# Patient Record
Sex: FEMALE | Race: WHITE | URBAN - METROPOLITAN AREA
[De-identification: names, ages, dates, MRNs, and addresses within clinical notes are randomized per-mention and may not be internally consistent; named-entity substitution may affect disease eponyms.]

---

## 2017-02-02 PROBLEM — Z98.51 TUBAL LIGATION STATUS: Status: ACTIVE | Noted: 2017-02-02

## 2017-02-02 PROBLEM — E78.1 PURE HYPERTRIGLYCERIDEMIA: Status: ACTIVE | Noted: 2017-02-02

## 2017-02-03 ENCOUNTER — OFFICE VISIT (OUTPATIENT)
Dept: FAMILY MEDICINE CLINIC | Facility: CLINIC | Age: 52
End: 2017-02-03

## 2017-02-03 VITALS
HEART RATE: 56 BPM | BODY MASS INDEX: 25.97 KG/M2 | DIASTOLIC BLOOD PRESSURE: 90 MMHG | RESPIRATION RATE: 20 BRPM | HEIGHT: 60.75 IN | TEMPERATURE: 98 F | SYSTOLIC BLOOD PRESSURE: 142 MMHG | WEIGHT: 135.81 LBS

## 2017-02-03 DIAGNOSIS — L50.9 HIVES: Primary | ICD-10-CM

## 2017-02-03 DIAGNOSIS — C50.912 MALIGNANT NEOPLASM OF LEFT FEMALE BREAST, UNSPECIFIED SITE OF BREAST: ICD-10-CM

## 2017-02-03 PROBLEM — E78.5 HYPERLIPIDEMIA: Status: ACTIVE | Noted: 2017-02-03

## 2017-02-03 PROBLEM — M19.90 OSTEOARTHRITIS: Status: ACTIVE | Noted: 2017-02-03

## 2017-02-03 PROBLEM — J45.909 ASTHMA: Status: ACTIVE | Noted: 2017-02-03

## 2017-02-03 PROCEDURE — 99213 OFFICE O/P EST LOW 20 MIN: CPT | Performed by: FAMILY MEDICINE

## 2017-02-03 RX ORDER — DICYCLOMINE HCL 20 MG
20 TABLET ORAL
COMMUNITY
End: 2017-08-29

## 2017-02-03 RX ORDER — OMEGA-3-ACID ETHYL ESTERS 1 G/1
2 CAPSULE, LIQUID FILLED ORAL 2 TIMES DAILY
COMMUNITY
End: 2017-07-19

## 2017-02-03 RX ORDER — NIACIN 500 MG
500 TABLET ORAL DAILY
COMMUNITY
End: 2017-07-19

## 2017-02-03 RX ORDER — GEMFIBROZIL 600 MG/1
600 TABLET, FILM COATED ORAL 2 TIMES DAILY
COMMUNITY
End: 2017-02-03 | Stop reason: CLARIF

## 2017-02-03 RX ORDER — CETIRIZINE HYDROCHLORIDE 10 MG/1
10 TABLET ORAL 2 TIMES DAILY
COMMUNITY
End: 2017-07-19 | Stop reason: ALTCHOICE

## 2017-02-03 RX ORDER — FLUTICASONE PROPIONATE AND SALMETEROL 100; 50 UG/1; UG/1
POWDER RESPIRATORY (INHALATION) DAILY
COMMUNITY
End: 2017-07-19

## 2017-02-03 RX ORDER — ALBUTEROL SULFATE 90 UG/1
AEROSOL, METERED RESPIRATORY (INHALATION)
COMMUNITY
End: 2017-02-03 | Stop reason: CLARIF

## 2017-02-03 NOTE — PROGRESS NOTES
[facility] [place]  PROGRESS NOTE  Chief Complaint:   Patient presents with:  Rash: generalized- has seen dermatology      HPI:   This is a 46year old female coming in for [address] [place] [address] 9, 31, 30 2015 fluticasone-salmeterol (ADVAIR DISKUS) 100-50 MCG/DOSE Inhalation Aerosol Powder, Breath Activated Inhale  mg into the lungs daily. Disp:  Rfl:    niacin 500 MG Oral Tab Take 500 mg by mouth daily.  Disp:  Rfl:    Omega-3-acid Ethyl Esters (LOVAZA) anxiety. ENDOCRINOLOGIC:  Denies excessive sweating, cold or heat intolerance, polyuria or polydipsia.   ALLERGIES: SEE HPI    EXAM:   /90 mmHg  Pulse 56  Temp(Src) 97.7 °F (36.5 °C) (Tympanic)  Resp 20  Ht 60.75\"  Wt 135 lb 13 oz  BMI 25.87 kg/m2 E of breast and absent nipple     Osteoarthrosis     Tubal ligation status     Hives     Asthma     Hyperlipidemia     Osteoarthritis      Annemarie Loomis MD  2/3/2017  5:07 PM

## 2017-02-28 ENCOUNTER — TELEPHONE (OUTPATIENT)
Dept: FAMILY MEDICINE CLINIC | Facility: CLINIC | Age: 52
End: 2017-02-28

## 2017-02-28 DIAGNOSIS — R73.9 HYPERGLYCEMIA: Primary | ICD-10-CM

## 2017-02-28 DIAGNOSIS — E78.1 HYPERTRIGLYCERIDEMIA: ICD-10-CM

## 2017-02-28 RX ORDER — AMITRIPTYLINE HYDROCHLORIDE 10 MG/1
10 TABLET, FILM COATED ORAL NIGHTLY
Refills: 3 | COMMUNITY
Start: 2017-02-06 | End: 2020-11-25 | Stop reason: ALTCHOICE

## 2017-02-28 NOTE — TELEPHONE ENCOUNTER
Antonieta. I have no lab orders at this time. Pt can have fasting labs done in [facility].

## 2017-02-28 NOTE — TELEPHONE ENCOUNTER
Pt is getting lab drawn w/ Dr. Mine Pardo on 3/6. Pt last drawn on 11/1/16. Pt had elevated glucose at that time-113. Pt wanted to know if CR is needing any labs at this time-otherwise pt will have labs drawn again per Dr. Mine Pardo in July.   TAF#[phone]

## 2017-03-16 ENCOUNTER — TELEPHONE (OUTPATIENT)
Dept: FAMILY MEDICINE CLINIC | Facility: CLINIC | Age: 52
End: 2017-03-16

## 2017-03-17 ENCOUNTER — OFFICE VISIT (OUTPATIENT)
Dept: FAMILY MEDICINE CLINIC | Facility: CLINIC | Age: 52
End: 2017-03-17

## 2017-03-17 VITALS
BODY MASS INDEX: 26.01 KG/M2 | HEART RATE: 60 BPM | SYSTOLIC BLOOD PRESSURE: 126 MMHG | HEIGHT: 60.5 IN | WEIGHT: 136 LBS | TEMPERATURE: 98 F | DIASTOLIC BLOOD PRESSURE: 90 MMHG | RESPIRATION RATE: 20 BRPM

## 2017-03-17 DIAGNOSIS — Z01.818 PREOP EXAMINATION: Primary | ICD-10-CM

## 2017-03-17 DIAGNOSIS — C50.912 MALIGNANT NEOPLASM OF LEFT FEMALE BREAST, UNSPECIFIED SITE OF BREAST: ICD-10-CM

## 2017-03-17 DIAGNOSIS — Z90.13 ACQUIRED ABSENCE OF BREAST AND ABSENT NIPPLE, BILATERAL: ICD-10-CM

## 2017-03-17 DIAGNOSIS — N65.0 DEFORMITY OF RECONSTRUCTED BREAST: ICD-10-CM

## 2017-03-17 PROCEDURE — 99215 OFFICE O/P EST HI 40 MIN: CPT | Performed by: FAMILY MEDICINE

## 2017-03-17 PROCEDURE — 93000 ELECTROCARDIOGRAM COMPLETE: CPT | Performed by: FAMILY MEDICINE

## 2017-03-17 RX ORDER — CEPHALEXIN 500 MG/1
CAPSULE ORAL
Refills: 0 | COMMUNITY
Start: 2017-03-15 | End: 2017-05-09

## 2017-03-17 RX ORDER — PREDNISONE 10 MG/1
10 TABLET ORAL
Refills: 0 | COMMUNITY
Start: 2017-03-03 | End: 2017-10-13 | Stop reason: ALTCHOICE

## 2017-03-17 RX ORDER — HYDROCODONE BITARTRATE AND ACETAMINOPHEN 7.5; 325 MG/1; MG/1
TABLET ORAL
Refills: 0 | COMMUNITY
Start: 2017-03-16 | End: 2017-05-09

## 2017-03-17 NOTE — PATIENT INSTRUCTIONS
Pt doing well.  Plan for 6 month f.u - pap after July 1  Recheck post op prn    Pt medically cleared for breast reconstructive surgery

## 2017-03-17 NOTE — PROGRESS NOTES
CC: Annual Physical Exam    HPI:   Tim Xavier is a 46year old female who presents for a complete physical exam.  Patient complains of need for preop clearance.      Pt with fall about 6 month ago- leaking of silocone implant--right- to have replaced 300 mg by mouth 3 (three) times daily. Disp:  Rfl:    gemfibrozil (LOPID) 600 MG Oral Tab Take 600 mg by mouth 2 (two) times daily. Disp:  Rfl:    Multiple Vitamins-Minerals (CENTRUM SILVER) Oral Tab Take 1 tablet by mouth daily.    Disp:  Rfl:    hydro pain, visual loss, blurred vision, double vision or yellow sclerae. Ears, Nose, Throat:  Denies hearing loss, sneezing, congestion, runny nose or sore throat. INTEGUMENTARY:  Denies rashes, itching, skin lesion, or excessive skin dryness.   CARDIOVASCULAR: no thyromegaly. SKIN: No rashes, no skin lesion, no bruising, good turgor. HEART:  Regular rate and rhythm, no murmurs, rubs or gallops. LUNGS: Clear to auscultation bilterally, no rales/rhonchi/wheezing.   BREAST: Scars of both breasts with palpable imp patient indicates understanding of these issues and agrees to the plan.         Meds & Refills for this Visit:    No prescriptions requested or ordered in this encounter       Imaging & Consults:  ELECTROCARDIOGRAM, COMPLETE    The patient is asked to retur

## 2017-03-17 NOTE — TELEPHONE ENCOUNTER
Future Appointments  Date Time Provider Vicente Estrada   3/17/2017 9:30 AM Brisa Flower MD [facility] [facility]       See note above from yesterday, pt with appt this AM.

## 2017-03-20 ENCOUNTER — TELEPHONE (OUTPATIENT)
Dept: FAMILY MEDICINE CLINIC | Facility: CLINIC | Age: 52
End: 2017-03-20

## 2017-05-09 ENCOUNTER — OFFICE VISIT (OUTPATIENT)
Dept: FAMILY MEDICINE CLINIC | Facility: CLINIC | Age: 52
End: 2017-05-09

## 2017-05-09 VITALS
WEIGHT: 138.19 LBS | HEART RATE: 64 BPM | TEMPERATURE: 98 F | SYSTOLIC BLOOD PRESSURE: 126 MMHG | BODY MASS INDEX: 26.43 KG/M2 | HEIGHT: 60.5 IN | DIASTOLIC BLOOD PRESSURE: 90 MMHG | RESPIRATION RATE: 16 BRPM

## 2017-05-09 DIAGNOSIS — N30.01 ACUTE CYSTITIS WITH HEMATURIA: Primary | ICD-10-CM

## 2017-05-09 PROCEDURE — 81003 URINALYSIS AUTO W/O SCOPE: CPT | Performed by: NURSE PRACTITIONER

## 2017-05-09 PROCEDURE — 87086 URINE CULTURE/COLONY COUNT: CPT | Performed by: NURSE PRACTITIONER

## 2017-05-09 PROCEDURE — 99213 OFFICE O/P EST LOW 20 MIN: CPT | Performed by: NURSE PRACTITIONER

## 2017-05-09 RX ORDER — NITROFURANTOIN 25; 75 MG/1; MG/1
100 CAPSULE ORAL 2 TIMES DAILY
Qty: 14 CAPSULE | Refills: 0 | Status: SHIPPED | OUTPATIENT
Start: 2017-05-09 | End: 2017-05-16

## 2017-05-09 RX ORDER — PHENAZOPYRIDINE HYDROCHLORIDE 200 MG/1
200 TABLET, FILM COATED ORAL 3 TIMES DAILY PRN
Qty: 9 TABLET | Refills: 0 | Status: SHIPPED | OUTPATIENT
Start: 2017-05-09 | End: 2017-07-19 | Stop reason: ALTCHOICE

## 2017-05-09 NOTE — PROGRESS NOTES
HPI:    Patient ID: Lary Christopher is a 46year old female. Urinary Frequency   This is a new problem. The current episode started yesterday. The problem occurs every urination. The problem has been gradually worsening.  Associated symptoms include ch MCG/DOSE Inhalation Aerosol Powder, Breath Activated Inhale  mg into the lungs daily. Disp:  Rfl:    niacin 500 MG Oral Tab Take 500 mg by mouth daily.  Disp:  Rfl:    Omega-3-acid Ethyl Esters (LOVAZA) 1 g Oral Cap Take 2 g by mouth 2 (two) times carrie Phenazopyridine HCl (PYRIDIUM) 200 MG Oral Tab 9 tablet 0      Sig: Take 1 tablet (200 mg total) by mouth 3 (three) times daily as needed for Pain (for bladder spasms).            Imaging & Referrals:  None        Patient Instructions   Urine culture pendin

## 2017-05-09 NOTE — PATIENT INSTRUCTIONS
Urine culture pending. Take Pyridium 3 times daily as needed. Informed patient we will change her urine to upright orange color. Continue to push fluids.   Encouraged to eat yogurt or take probiotic daily while on antibiotic for prevention of a yeast inf

## 2017-05-11 ENCOUNTER — TELEPHONE (OUTPATIENT)
Dept: FAMILY MEDICINE CLINIC | Facility: CLINIC | Age: 52
End: 2017-05-11

## 2017-05-11 NOTE — TELEPHONE ENCOUNTER
----- Message from DAREN Valentino sent at 5/11/2017  9:44 AM CDT -----  Urine culture is negative. Please let patient know. Thank you.  Neema Menendez, 05/11/2017, 9:44 AM

## 2017-07-05 ENCOUNTER — TELEPHONE (OUTPATIENT)
Dept: FAMILY MEDICINE CLINIC | Facility: CLINIC | Age: 52
End: 2017-07-05

## 2017-07-05 DIAGNOSIS — E78.5 HYPERLIPIDEMIA, UNSPECIFIED HYPERLIPIDEMIA TYPE: Primary | ICD-10-CM

## 2017-07-05 RX ORDER — GEMFIBROZIL 600 MG/1
600 TABLET, FILM COATED ORAL 2 TIMES DAILY
Refills: 0 | Status: CANCELLED | OUTPATIENT
Start: 2017-07-05

## 2017-07-05 RX ORDER — OMEGA-3-ACID ETHYL ESTERS 1 G/1
2 CAPSULE, LIQUID FILLED ORAL 2 TIMES DAILY
Refills: 0 | Status: CANCELLED | OUTPATIENT
Start: 2017-07-05

## 2017-07-05 NOTE — TELEPHONE ENCOUNTER
[id number] Jillian Rueda for order for lipid and chem panel    Refill of lopid pendng lab review    Lovaza Rx is not from us.

## 2017-07-05 NOTE — TELEPHONE ENCOUNTER
Future Appointments  Date Time Provider Vicente Estrada   7/19/2017 11:00 AM Cj Camacho MD [facility] [place] [facility] [place]       1. Request per pt - add lipids to labs ordered per hematology?  Pt has lab appt set for 7/17 for Dr. Cristina Castaneda, last lipid p

## 2017-07-05 NOTE — TELEPHONE ENCOUNTER
Pt informed, states she is good on medications at this time. Pt states Lovaza is refilled per CR- last sent to mail order on 8/22/16.   Lab orders faxed to Dr. Ronen Katz

## 2017-07-17 LAB
AMB EXT CHOL/HDL RATIO: 4.8
AMB EXT CHOLESTEROL, TOTAL: 173 MG/DL
AMB EXT CREATININE: 0.87 MG/DL
AMB EXT GLUCOSE: 97 MG/DL
AMB EXT HDL CHOLESTEROL: 36 MG/DL
AMB EXT LDL CHOLESTEROL, DIRECT: 92 MG/DL
AMB EXT NON HDL CHOL: 137 MG/DL
AMB EXT TRIGLYCERIDES: 225 MG/DL

## 2017-07-19 ENCOUNTER — OFFICE VISIT (OUTPATIENT)
Dept: FAMILY MEDICINE CLINIC | Facility: CLINIC | Age: 52
End: 2017-07-19

## 2017-07-19 ENCOUNTER — TELEPHONE (OUTPATIENT)
Dept: FAMILY MEDICINE CLINIC | Facility: CLINIC | Age: 52
End: 2017-07-19

## 2017-07-19 VITALS
HEIGHT: 60.5 IN | SYSTOLIC BLOOD PRESSURE: 122 MMHG | WEIGHT: 136.81 LBS | DIASTOLIC BLOOD PRESSURE: 80 MMHG | HEART RATE: 64 BPM | TEMPERATURE: 98 F | BODY MASS INDEX: 26.17 KG/M2 | RESPIRATION RATE: 20 BRPM

## 2017-07-19 DIAGNOSIS — E78.1 PURE HYPERTRIGLYCERIDEMIA: ICD-10-CM

## 2017-07-19 DIAGNOSIS — C50.912 MALIGNANT NEOPLASM OF LEFT FEMALE BREAST, UNSPECIFIED ESTROGEN RECEPTOR STATUS, UNSPECIFIED SITE OF BREAST (HCC): ICD-10-CM

## 2017-07-19 DIAGNOSIS — E78.5 HYPERLIPIDEMIA, UNSPECIFIED HYPERLIPIDEMIA TYPE: ICD-10-CM

## 2017-07-19 DIAGNOSIS — Z00.00 VISIT FOR ANNUAL HEALTH EXAMINATION: Primary | ICD-10-CM

## 2017-07-19 DIAGNOSIS — M15.9 PRIMARY OSTEOARTHRITIS INVOLVING MULTIPLE JOINTS: ICD-10-CM

## 2017-07-19 LAB
ALT: 27
AST: 27
BILIRUBIN, TOTAL: 0.5 MG/DL
BUN: 26
CA 15-3: 11
CA 27.29: 15 U/ML
CALCIUM: 9.9
CHLORIDE: 104
HEMATOCRIT: 40.5 %
HEMOGLOBIN: 14.1 G/DL (ref 12–15)
MPV: 10.6
PLATELETS: 210 X10E3/UL
POTASSIUM: 4.4
RBC: 4.62 /HPF
SODIUM: 140
WBC: 3.3 /HPF

## 2017-07-19 PROCEDURE — 99396 PREV VISIT EST AGE 40-64: CPT | Performed by: FAMILY MEDICINE

## 2017-07-19 RX ORDER — DICYCLOMINE HCL 20 MG
TABLET ORAL
COMMUNITY
End: 2017-07-19

## 2017-07-19 RX ORDER — GEMFIBROZIL 600 MG/1
600 TABLET, FILM COATED ORAL 2 TIMES DAILY
COMMUNITY
End: 2017-07-19

## 2017-07-19 RX ORDER — GEMFIBROZIL 600 MG/1
600 TABLET, FILM COATED ORAL 2 TIMES DAILY
Qty: 180 TABLET | Refills: 3 | Status: SHIPPED | OUTPATIENT
Start: 2017-07-19 | End: 2018-06-03

## 2017-07-19 RX ORDER — NIACIN 500 MG
500 TABLET ORAL DAILY
COMMUNITY
End: 2021-12-02

## 2017-07-19 RX ORDER — CETIRIZINE HYDROCHLORIDE 10 MG/1
10 TABLET ORAL DAILY
COMMUNITY

## 2017-07-19 RX ORDER — FLUTICASONE PROPIONATE AND SALMETEROL 100; 50 UG/1; UG/1
1 POWDER RESPIRATORY (INHALATION) DAILY
COMMUNITY
End: 2020-10-12

## 2017-07-19 RX ORDER — OMEGA-3-ACID ETHYL ESTERS 1 G/1
2 CAPSULE, LIQUID FILLED ORAL 2 TIMES DAILY
Qty: 180 CAPSULE | Refills: 3 | Status: SHIPPED | OUTPATIENT
Start: 2017-07-19 | End: 2017-10-22

## 2017-07-19 RX ORDER — OMEGA-3-ACID ETHYL ESTERS 1 G/1
2 CAPSULE, LIQUID FILLED ORAL 2 TIMES DAILY
COMMUNITY
End: 2017-07-19

## 2017-07-19 RX ORDER — ALBUTEROL SULFATE 90 UG/1
2 AEROSOL, METERED RESPIRATORY (INHALATION) AS NEEDED
COMMUNITY
End: 2020-10-12

## 2017-07-19 NOTE — PROGRESS NOTES
CC: Annual Physical Exam    HPI:   Roscoe Boone is a 46year old female who presents for a complete physical exam.  Patient complains of generally doing well    Recent cut on left hand-miror,skin healed -still sore.      Pt had breast implant replaced g Oral Cap Take 2 capsules (2 g total) by mouth 2 (two) times daily. Disp: 180 capsule Rfl: 3   Amitriptyline HCl 10 MG Oral Tab Take 10 mg by mouth nightly. Disp:  Rfl: 3   Dicyclomine HCl (BENTYL) 20 MG Oral Tab Take 20 mg by mouth.  PRN Disp:  Rfl: Concern  Caffeine Concern        No  Exercise                Yes  Seat Belt               Yes  Special Diet            Yes    Comment:low fat, low sugar  Stress Concern          No  Weight Concern          No        Exercise: several times a week.   Diet: m alert, awake and oriented, well developed, well nourished, no apparent distress.   HEENT:  Head:  Normocephalic, atraumatic Eyes: EOMI, PERRLA, no scleral icterus, conjunctivae clear bilaterally, no eye discharge Ears: TM's clear and intact bilaterally, no encounter diagnosis)  Primary osteoarthritis involving multiple joints  Malignant neoplasm of left female breast, unspecified estrogen receptor status, unspecified site of breast    Patient Instructions   Hydrate well with water    mvi and bcomplex daily

## 2017-07-20 PROBLEM — Z98.51 TUBAL LIGATION STATUS: Status: RESOLVED | Noted: 2017-02-02 | Resolved: 2017-07-20

## 2017-07-31 ENCOUNTER — OFFICE VISIT (OUTPATIENT)
Dept: FAMILY MEDICINE CLINIC | Facility: CLINIC | Age: 52
End: 2017-07-31

## 2017-07-31 VITALS
RESPIRATION RATE: 16 BRPM | OXYGEN SATURATION: 96 % | HEART RATE: 93 BPM | HEIGHT: 60.5 IN | TEMPERATURE: 99 F | SYSTOLIC BLOOD PRESSURE: 112 MMHG | DIASTOLIC BLOOD PRESSURE: 74 MMHG | WEIGHT: 137.38 LBS | BODY MASS INDEX: 26.27 KG/M2

## 2017-07-31 DIAGNOSIS — J01.90 ACUTE SINUSITIS, RECURRENCE NOT SPECIFIED, UNSPECIFIED LOCATION: Primary | ICD-10-CM

## 2017-07-31 DIAGNOSIS — R05.9 COUGH: ICD-10-CM

## 2017-07-31 PROCEDURE — 99213 OFFICE O/P EST LOW 20 MIN: CPT | Performed by: NURSE PRACTITIONER

## 2017-07-31 RX ORDER — AMOXICILLIN AND CLAVULANATE POTASSIUM 875; 125 MG/1; MG/1
1 TABLET, FILM COATED ORAL 2 TIMES DAILY
Qty: 20 TABLET | Refills: 0 | Status: SHIPPED | OUTPATIENT
Start: 2017-07-31 | End: 2017-08-10

## 2017-07-31 NOTE — PATIENT INSTRUCTIONS
Directed to take antibiotics until gone. Recommend to eat yogurt or take probiotic daily while on antibiotic. Treat symptoms as needed. Take Coricidin HPB for the congestion  Return to clinic if not better in 48-72 hours. Otherwise follow-up as needed.

## 2017-07-31 NOTE — PROGRESS NOTES
HPI:    Patient ID: Johnnie Oconnor is a 46year old female. HPI     Present with complaints of cough with green/brown sputum. Also is having pain to the lymph nodes as well as the ears. Started last week on Wednesday. Thursday started sudafed.    No f nightly. Disp:  Rfl: 3   Dicyclomine HCl (BENTYL) 20 MG Oral Tab Take 20 mg by mouth. PRN Disp:  Rfl:    anastrozole 1 MG Oral Tab tab Take 1 mg by mouth daily.    Disp:  Rfl:    Calcium Carb-Cholecalciferol (CALCIUM 1000 + D) 1000-800 MG-UNIT Oral Tab da encounter diagnosis)  Cough    No orders of the defined types were placed in this encounter.       Meds This Visit:  Signed Prescriptions Disp Refills    Amoxicillin-Pot Clavulanate 875-125 MG Oral Tab 20 tablet 0      Sig: Take 1 tablet by mouth 2 (two) ti

## 2017-08-04 ENCOUNTER — TELEPHONE (OUTPATIENT)
Dept: FAMILY MEDICINE CLINIC | Facility: CLINIC | Age: 52
End: 2017-08-04

## 2017-08-04 NOTE — TELEPHONE ENCOUNTER
Pt calling-states that R ear is plugged. Pt states that sinus s/s are improved  Pt was seen on 7/31, currently on antx- pt mentioned that she also started on nasal spray today.   Discussed with Cynthia Flores NP,  Pt advised to continue with nasal spray,

## 2017-08-04 NOTE — TELEPHONE ENCOUNTER
ears still plugged      on antibiotic and wants to get hearing back     Please advise RX to [facility] in [facility]

## 2017-08-07 ENCOUNTER — OFFICE VISIT (OUTPATIENT)
Dept: FAMILY MEDICINE CLINIC | Facility: CLINIC | Age: 52
End: 2017-08-07

## 2017-08-07 ENCOUNTER — TELEPHONE (OUTPATIENT)
Dept: FAMILY MEDICINE CLINIC | Facility: CLINIC | Age: 52
End: 2017-08-07

## 2017-08-07 VITALS
RESPIRATION RATE: 16 BRPM | TEMPERATURE: 98 F | DIASTOLIC BLOOD PRESSURE: 76 MMHG | HEART RATE: 78 BPM | HEIGHT: 61 IN | WEIGHT: 141.13 LBS | SYSTOLIC BLOOD PRESSURE: 118 MMHG | BODY MASS INDEX: 26.65 KG/M2

## 2017-08-07 DIAGNOSIS — J01.90 ACUTE SINUSITIS, RECURRENCE NOT SPECIFIED, UNSPECIFIED LOCATION: ICD-10-CM

## 2017-08-07 DIAGNOSIS — H65.01 RIGHT ACUTE SEROUS OTITIS MEDIA, RECURRENCE NOT SPECIFIED: Primary | ICD-10-CM

## 2017-08-07 PROCEDURE — 99213 OFFICE O/P EST LOW 20 MIN: CPT | Performed by: NURSE PRACTITIONER

## 2017-08-07 RX ORDER — FLUTICASONE PROPIONATE 50 MCG
2 SPRAY, SUSPENSION (ML) NASAL DAILY
Qty: 3 BOTTLE | Refills: 0 | Status: SHIPPED | OUTPATIENT
Start: 2017-08-07

## 2017-08-07 RX ORDER — FLUTICASONE PROPIONATE 50 MCG
2 SPRAY, SUSPENSION (ML) NASAL DAILY
Qty: 1 BOTTLE | Refills: 0 | Status: SHIPPED | OUTPATIENT
Start: 2017-08-07 | End: 2017-08-07

## 2017-08-07 RX ORDER — AZITHROMYCIN 500 MG/1
500 TABLET, FILM COATED ORAL DAILY
Qty: 7 TABLET | Refills: 0 | Status: SHIPPED | OUTPATIENT
Start: 2017-08-07 | End: 2017-08-14

## 2017-08-07 NOTE — PROGRESS NOTES
HPI:    Patient ID: Leena Pfeiffer is a 46year old female. HPI    Present with complaints of right ear ringing with a whistling sound. Also started on medication for pink eye. Is still on pink eye. All the other symptoms are better.  Left ear gets pl nightly. Disp:  Rfl: 3   Dicyclomine HCl (BENTYL) 20 MG Oral Tab Take 20 mg by mouth. PRN Disp:  Rfl:    anastrozole 1 MG Oral Tab tab Take 1 mg by mouth daily.    Disp:  Rfl:    Calcium Carb-Cholecalciferol (CALCIUM 1000 + D) 1000-800 MG-UNIT Oral Tab da or ordered in this encounter    Imaging & Referrals:  None      There are no Patient Instructions on file for this visit.     CN#[id number]

## 2017-08-07 NOTE — TELEPHONE ENCOUNTER
Pt states her ear is still plugged and ringing, states she can hear her ear beat. Pt states she tried to call ENT- they do not any appts until the end of the month. Pt has been using otc nasal spray-no chnage.   Follow up appt scheduled with Neema Tavares

## 2017-08-07 NOTE — PATIENT INSTRUCTIONS
Start taking Zithromax 500 mg daily in addition to the Augmentin. Refill Flonase nasal spray for patient and encouraged to start using regularly. Do not use nasal decongestants. Continue with the yogurt or probiotic while on the antibiotic.   If needs to

## 2017-08-08 ENCOUNTER — TELEPHONE (OUTPATIENT)
Dept: FAMILY MEDICINE CLINIC | Facility: CLINIC | Age: 52
End: 2017-08-08

## 2017-08-08 DIAGNOSIS — E78.1 HYPERTRIGLYCERIDEMIA: Primary | ICD-10-CM

## 2017-08-08 NOTE — TELEPHONE ENCOUNTER
Pt had A1C order from Feb. 6 [facility] lab and 's office, no record of lab report found. Pt latest glucose done on 7/17/17 was 97. Cancel A1C order, or postpone lab ? Please advise.

## 2017-08-09 NOTE — TELEPHONE ENCOUNTER
Pt informed- states she will be getting drawn again for Dr. Carlos Law in early November. Pt is asking to also have lipid order added to A1C order - pt states her Triglycerides were high at last draw on 7/17/17. Pt has hx: of hypertriglycerdemia.

## 2017-08-10 ENCOUNTER — TELEPHONE (OUTPATIENT)
Dept: FAMILY MEDICINE CLINIC | Facility: CLINIC | Age: 52
End: 2017-08-10

## 2017-08-10 NOTE — TELEPHONE ENCOUNTER
Pt states that the Z Pack prescribed on Monday has given her bad diarrhea since then. Pt states that she has taken Immodium OTC and it still isn't helping. Pt states antibiotic is not helping her ear pain.     Please advise on what your recommendations

## 2017-08-10 NOTE — TELEPHONE ENCOUNTER
Stop the antibiotic. If diarrhea persists, needs appointment for possible stool specimen. Recommend ibuprofen and antihistamines for the ear pain.    Neema Menendez, 08/10/17, 1:40 PM

## 2017-08-29 RX ORDER — DICYCLOMINE HCL 20 MG
20 TABLET ORAL
Refills: 0 | Status: CANCELLED | OUTPATIENT
Start: 2017-08-29

## 2017-08-29 RX ORDER — DICYCLOMINE HYDROCHLORIDE 10 MG/1
CAPSULE ORAL
Qty: 90 CAPSULE | Refills: 1 | Status: SHIPPED | OUTPATIENT
Start: 2017-08-29 | End: 2021-03-30

## 2017-08-29 NOTE — TELEPHONE ENCOUNTER
Pt last seen for annual on 7/19/17  Pt states she has hx: IBS, pt states her last RX of 30 pills lasted 2 years,   Rsoa Levo states her IBS episodes are very infrequent. Pt states she reviewed her medications with CR at time of her px.

## 2017-09-20 ENCOUNTER — TELEPHONE (OUTPATIENT)
Dept: FAMILY MEDICINE CLINIC | Facility: CLINIC | Age: 52
End: 2017-09-20

## 2017-09-20 DIAGNOSIS — E78.2 MIXED HYPERLIPIDEMIA: Primary | ICD-10-CM

## 2017-09-20 DIAGNOSIS — Z00.00 PERIODIC HEALTH ASSESSMENT, GENERAL SCREENING, ADULT: ICD-10-CM

## 2017-09-20 NOTE — TELEPHONE ENCOUNTER
Pt informed,  Pt states she is interested in having a titer drawn  Pt states she will be due for lipid panel again in NOvember. Pt would like lab orders faxed to Dr. Anisha Trinidad office please.

## 2017-09-20 NOTE — TELEPHONE ENCOUNTER
Pt inquired about HPV vaccine- pt informed that is recommended for ages as young as 5 thru age 32. Pt states she thought she had chicken pox seen on- not seen in [facility]- also looked for paper chart-   No longer available.  Pt was seen for wellness ex

## 2017-10-13 ENCOUNTER — OFFICE VISIT (OUTPATIENT)
Dept: FAMILY MEDICINE CLINIC | Facility: CLINIC | Age: 52
End: 2017-10-13

## 2017-10-13 VITALS
BODY MASS INDEX: 26.62 KG/M2 | TEMPERATURE: 98 F | SYSTOLIC BLOOD PRESSURE: 132 MMHG | WEIGHT: 141 LBS | HEART RATE: 80 BPM | HEIGHT: 61 IN | RESPIRATION RATE: 16 BRPM | DIASTOLIC BLOOD PRESSURE: 76 MMHG

## 2017-10-13 DIAGNOSIS — R07.81 RIB PAIN ON LEFT SIDE: Primary | ICD-10-CM

## 2017-10-13 PROCEDURE — 99214 OFFICE O/P EST MOD 30 MIN: CPT | Performed by: FAMILY MEDICINE

## 2017-10-13 RX ORDER — NAPROXEN 500 MG/1
500 TABLET ORAL 2 TIMES DAILY WITH MEALS
Qty: 40 TABLET | Refills: 2 | Status: SHIPPED | OUTPATIENT
Start: 2017-10-13 | End: 2017-12-04 | Stop reason: ALTCHOICE

## 2017-10-13 NOTE — PROGRESS NOTES
Chief Complaint:   Patient presents with:  Pain: Patient fell at the gym a week ago and is still having rib pain left side      HPI:   This is a 46year old female coming in for pain in the right chest wall area. Patient had a fall while exercising. Biotin 92803 MCG Oral Tablet Dispersible Take 1 tablet by mouth daily. Disp:  Rfl:    gemfibrozil (LOPID) 600 MG Oral Tab Take 1 tablet (600 mg total) by mouth 2 (two) times daily.  Disp: 180 tablet Rfl: 3   Omega-3-acid Ethyl Esters (LOVAZA) 1 g Oral Ca Wt 141 lb   BMI 26.64 kg/m²  Estimated body mass index is 26.64 kg/m² as calculated from the following:    Height as of this encounter: 61\". Weight as of this encounter: 141 lb. Vital signs reviewed. Appears stated age, well groomed.     Physical Exa

## 2017-10-23 RX ORDER — OMEGA-3-ACID ETHYL ESTERS 1 G/1
CAPSULE, LIQUID FILLED ORAL
Qty: 360 CAPSULE | Refills: 1 | Status: SHIPPED | OUTPATIENT
Start: 2017-10-23 | End: 2018-06-03

## 2017-10-23 NOTE — TELEPHONE ENCOUNTER
Future appt:     Your appointments     Date & Time Appointment Department [facility])    Oct 27, 2017  9:00 AM CDT Follow up with Kelley Ruiz MD [address] [facility]        [address], S

## 2017-12-04 ENCOUNTER — OFFICE VISIT (OUTPATIENT)
Dept: FAMILY MEDICINE CLINIC | Facility: CLINIC | Age: 52
End: 2017-12-04

## 2017-12-04 VITALS
TEMPERATURE: 99 F | OXYGEN SATURATION: 99 % | WEIGHT: 145 LBS | BODY MASS INDEX: 28.47 KG/M2 | DIASTOLIC BLOOD PRESSURE: 80 MMHG | SYSTOLIC BLOOD PRESSURE: 120 MMHG | HEIGHT: 60 IN | RESPIRATION RATE: 16 BRPM | HEART RATE: 80 BPM

## 2017-12-04 DIAGNOSIS — J02.9 SORE THROAT: ICD-10-CM

## 2017-12-04 DIAGNOSIS — J02.9 PHARYNGITIS, UNSPECIFIED ETIOLOGY: Primary | ICD-10-CM

## 2017-12-04 PROCEDURE — 87081 CULTURE SCREEN ONLY: CPT | Performed by: NURSE PRACTITIONER

## 2017-12-04 PROCEDURE — 87880 STREP A ASSAY W/OPTIC: CPT | Performed by: NURSE PRACTITIONER

## 2017-12-04 PROCEDURE — 99213 OFFICE O/P EST LOW 20 MIN: CPT | Performed by: NURSE PRACTITIONER

## 2017-12-04 RX ORDER — AMOXICILLIN 500 MG/1
500 CAPSULE ORAL 3 TIMES DAILY
Qty: 30 CAPSULE | Refills: 0 | Status: SHIPPED | OUTPATIENT
Start: 2017-12-04 | End: 2017-12-14

## 2017-12-04 NOTE — PATIENT INSTRUCTIONS
Push fluids, rest.  Rapid strep negative, strep throat culture pending, will take 2-3 days for results. Take antibiotics as prescribed, take with food. Tylenol or ibuprofen over the counter for discomfort. 1tsp honey three times a day x 3 days.   Warm sa · Stop smoking or reduce contact with secondhand smoke. Smoke irritates the tender throat lining. · Limit contact with pets and with allergy-causing substances, such as pollen and mold.   · When you’re around someone with a sore throat or cold, wash your h

## 2017-12-04 NOTE — PROGRESS NOTES
CHIEF COMPLAINT:   Patient presents with:  Sore Throat: headache, productive cough- brown [id number] has got worse      HPI:   Ivana Navarrete is a 46year old femalepresents to clinic with symptoms of sore throat, productive cough, and headache x one w niacin 500 MG Oral Tab Take 500 mg by mouth daily. Disp:  Rfl:    Biotin 24161 MCG Oral Tablet Dispersible Take 1 tablet by mouth daily. Disp:  Rfl:    gemfibrozil (LOPID) 600 MG Oral Tab Take 1 tablet (600 mg total) by mouth 2 (two) times daily.  Disp: 180 THROAT: oral mucosa pink, moist. Posterior pharynx erythematous and injected. no exudates. Tonsils 2/4. Breath non-malodorous. No uvular deviation. No drooling. NECK: supple  LUNGS: clear to auscultation bilaterally, no wheezes, crackles, or rhonchi.  Sera Sore throats happen for many reasons, such as colds, allergies, and infections caused by viruses or bacteria. In any case, your throat becomes red and sore.  Your goal for self-care is to reduce your discomfort while giving your throat a chance to heal.  Mo · A temperature over 101°F (38.3°C)  · White spots on the throat  · Great difficulty swallowing  · Trouble breathing  · A skin rash  · Recent exposure to someone else with strep bacteria  · Severe hoarseness and swollen glands in the neck or jaw   Date [place]

## 2017-12-06 ENCOUNTER — TELEPHONE (OUTPATIENT)
Dept: FAMILY MEDICINE CLINIC | Facility: CLINIC | Age: 52
End: 2017-12-06

## 2017-12-06 NOTE — TELEPHONE ENCOUNTER
----- Message from DAREN Christianson sent at 12/6/2017 12:32 PM CST -----  Negative strep culture- please notify patient

## 2017-12-20 ENCOUNTER — TELEPHONE (OUTPATIENT)
Dept: FAMILY MEDICINE CLINIC | Facility: CLINIC | Age: 52
End: 2017-12-20

## 2017-12-20 NOTE — TELEPHONE ENCOUNTER
A1C was ordered back 2/28/17-  However since then pt had glucose of 97 dated 7/17/17 and 94 dated 11/20/17-  Is A1C still recommended? Please advise.

## 2018-06-04 RX ORDER — GEMFIBROZIL 600 MG/1
TABLET, FILM COATED ORAL
Qty: 180 TABLET | Refills: 0 | Status: SHIPPED | OUTPATIENT
Start: 2018-06-04 | End: 2018-06-12

## 2018-06-04 RX ORDER — OMEGA-3-ACID ETHYL ESTERS 1 G/1
CAPSULE, LIQUID FILLED ORAL
Qty: 360 CAPSULE | Refills: 0 | Status: SHIPPED | OUTPATIENT
Start: 2018-06-04 | End: 2018-09-03

## 2018-06-04 NOTE — TELEPHONE ENCOUNTER
RF request from [facility] for [place] 3 #360 and Gemfibrozil 60 MG #180. Please advise.        Future appt:    Last Appointment:  7/19/2017; No f/u recommended    CHOLESTEROL (no units)   Date Value   11/20/2017 188   ----------  HDL Cholesterol (mg/dL)   D

## 2018-06-12 ENCOUNTER — TELEPHONE (OUTPATIENT)
Dept: FAMILY MEDICINE CLINIC | Facility: CLINIC | Age: 53
End: 2018-06-12

## 2018-06-12 RX ORDER — GEMFIBROZIL 600 MG/1
TABLET, FILM COATED ORAL
Qty: 180 TABLET | Refills: 0 | Status: SHIPPED | OUTPATIENT
Start: 2018-06-12 | End: 2018-07-09

## 2018-06-12 NOTE — TELEPHONE ENCOUNTER
Future appt:    Last Appointment:  7/19/17 with Dr. Julito Howard for physical  This med was refilled to [facility] mailorder #180  Patient states she was notified that the [facility] does not have this med in [address] - it is on backorder  Patient

## 2018-06-14 ENCOUNTER — OFFICE VISIT (OUTPATIENT)
Dept: FAMILY MEDICINE CLINIC | Facility: CLINIC | Age: 53
End: 2018-06-14

## 2018-06-14 VITALS
HEART RATE: 66 BPM | TEMPERATURE: 97 F | BODY MASS INDEX: 28.11 KG/M2 | SYSTOLIC BLOOD PRESSURE: 104 MMHG | WEIGHT: 143.19 LBS | HEIGHT: 60 IN | DIASTOLIC BLOOD PRESSURE: 66 MMHG

## 2018-06-14 DIAGNOSIS — M62.838 MUSCLE SPASM: Primary | ICD-10-CM

## 2018-06-14 PROCEDURE — 99213 OFFICE O/P EST LOW 20 MIN: CPT | Performed by: NURSE PRACTITIONER

## 2018-06-14 RX ORDER — CYCLOBENZAPRINE HCL 10 MG
10 TABLET ORAL NIGHTLY
Qty: 30 TABLET | Refills: 1 | Status: SHIPPED | OUTPATIENT
Start: 2018-06-14 | End: 2018-07-04

## 2018-06-14 NOTE — PROGRESS NOTES
Selena Heard is a 45year old female.   Patient presents with:  Pain: back pain, thinks she injured it lifting/working out      HPI:   Complaints of pain to her upper back - was pulling wts with upper back -   Started hurting a week ago- started to rig Biotin 76946 MCG Oral Tablet Dispersible Take 1 tablet by mouth daily. Disp:  Rfl:    Amitriptyline HCl 10 MG Oral Tab Take 10 mg by mouth nightly. Disp:  Rfl: 3   anastrozole 1 MG Oral Tab tab Take 1 mg by mouth daily.    Disp:  Rfl:    Calcium Carb-Ch without respiratory distress, lungs clear to auscultation  CARDIO: RRR without murmur, no edema  MUSCULOSKELETAL: No bony tenderness to thoracic spine with palpation. Positive muscle spasm and tenderness to trapezius bilaterally at scapular level.   Full r

## 2018-06-14 NOTE — PATIENT INSTRUCTIONS
Heat, rest. Cyclobenzaprine  At bed time as needed for muscle relaxation.        Consider physical therapy - follow up if symptoms persist or increase

## 2018-07-09 DIAGNOSIS — E78.1 PURE HYPERTRIGLYCERIDEMIA: Primary | ICD-10-CM

## 2018-07-09 RX ORDER — GEMFIBROZIL 600 MG/1
TABLET, FILM COATED ORAL
Qty: 180 TABLET | Refills: 0 | Status: SHIPPED | OUTPATIENT
Start: 2018-07-09 | End: 2018-09-03

## 2018-07-09 NOTE — TELEPHONE ENCOUNTER
Refill sent–please notify patient that she is due for a physical and have her schedule with Dr. Seferino Akers.

## 2018-07-10 LAB
HEMATOCRIT: 42.6 %
HEMOGLOBIN: 14.6 G/DL (ref 12–15)
MCH: 29.3 PG
MCHC: 34.3 G/DL
MCV: 85.4 FL
MPV: 10.4
PLATELETS: 217
RBC: 4.99 /HPF
RDW: 12.5 %
WBC: 3.5 /HPF

## 2018-07-10 NOTE — TELEPHONE ENCOUNTER
Only lab received dated: 3/19/18- was a CBC- entered into flow sheet.     Pt states she will need cholesterol checked prior to upcoming appt-  Last lipid panel was check back on 11/20/17-  Hx: with hypertriglyceremia, hyperlipidemia

## 2018-07-10 NOTE — TELEPHONE ENCOUNTER
Future Appointments  Date Time Provider Vicente Estrada   8/22/2018 8:00 AM Glenn Kevin MD [facility] [facility] [place]     Pt mentioned that she had labs drawn w/ Dr. Madan Yu in March. Report requested.   Pt states she may need another lab prior t

## 2018-07-10 NOTE — TELEPHONE ENCOUNTER
Future Appointments  Date Time Provider Vicente Natalie   7/21/2018 9:15 AM REF [place] [facility]   8/22/2018 8:00 AM Marcos Cordoba MD [facility] [place] [facility]

## 2018-08-09 ENCOUNTER — APPOINTMENT (OUTPATIENT)
Dept: LAB | Age: 53
End: 2018-08-09
Attending: FAMILY MEDICINE
Payer: COMMERCIAL

## 2018-08-09 DIAGNOSIS — E78.1 PURE HYPERTRIGLYCERIDEMIA: ICD-10-CM

## 2018-08-09 LAB
CHOLEST SMN-MCNC: 158 MG/DL (ref ?–200)
HDLC SERPL-MCNC: 33 MG/DL (ref 40–59)
LDLC SERPL CALC-MCNC: 58 MG/DL (ref ?–100)
NONHDLC SERPL-MCNC: 125 MG/DL (ref ?–130)
TRIGL SERPL-MCNC: 336 MG/DL (ref 30–149)
VLDLC SERPL CALC-MCNC: 67 MG/DL (ref 0–30)

## 2018-08-09 PROCEDURE — 36415 COLL VENOUS BLD VENIPUNCTURE: CPT | Performed by: FAMILY MEDICINE

## 2018-08-09 PROCEDURE — 80061 LIPID PANEL: CPT | Performed by: FAMILY MEDICINE

## 2018-08-14 ENCOUNTER — TELEPHONE (OUTPATIENT)
Dept: FAMILY MEDICINE CLINIC | Facility: CLINIC | Age: 53
End: 2018-08-14

## 2018-08-14 NOTE — TELEPHONE ENCOUNTER
Pt states she was \"finally\" able to view her lipid panel results on my chart. Pt mentioned that she was having troubles. Results reviewed w/ pt, Collins Moseley plans to discuss further at upcoming appt w/ CR.     Future Appointments  Date Time Provider Department

## 2018-08-22 ENCOUNTER — OFFICE VISIT (OUTPATIENT)
Dept: FAMILY MEDICINE CLINIC | Facility: CLINIC | Age: 53
End: 2018-08-22
Payer: COMMERCIAL

## 2018-08-22 VITALS
DIASTOLIC BLOOD PRESSURE: 78 MMHG | BODY MASS INDEX: 27.13 KG/M2 | RESPIRATION RATE: 14 BRPM | WEIGHT: 138.19 LBS | TEMPERATURE: 98 F | HEIGHT: 60 IN | OXYGEN SATURATION: 99 % | SYSTOLIC BLOOD PRESSURE: 124 MMHG | HEART RATE: 64 BPM

## 2018-08-22 DIAGNOSIS — M79.18 GLUTEAL PAIN: ICD-10-CM

## 2018-08-22 DIAGNOSIS — C50.912 MALIGNANT NEOPLASM OF LEFT FEMALE BREAST, UNSPECIFIED ESTROGEN RECEPTOR STATUS, UNSPECIFIED SITE OF BREAST (HCC): ICD-10-CM

## 2018-08-22 DIAGNOSIS — M51.37 DEGENERATION OF LUMBAR OR LUMBOSACRAL INTERVERTEBRAL DISC: ICD-10-CM

## 2018-08-22 DIAGNOSIS — E78.5 HYPERLIPIDEMIA, UNSPECIFIED HYPERLIPIDEMIA TYPE: ICD-10-CM

## 2018-08-22 DIAGNOSIS — Z00.00 ANNUAL PHYSICAL EXAM: Primary | ICD-10-CM

## 2018-08-22 DIAGNOSIS — M15.9 PRIMARY OSTEOARTHRITIS INVOLVING MULTIPLE JOINTS: ICD-10-CM

## 2018-08-22 PROBLEM — E78.1 PURE HYPERTRIGLYCERIDEMIA: Status: RESOLVED | Noted: 2017-02-02 | Resolved: 2018-08-22

## 2018-08-22 PROBLEM — L50.9 HIVES: Status: RESOLVED | Noted: 2017-02-03 | Resolved: 2018-08-22

## 2018-08-22 PROCEDURE — 99396 PREV VISIT EST AGE 40-64: CPT | Performed by: FAMILY MEDICINE

## 2018-08-22 PROCEDURE — 99214 OFFICE O/P EST MOD 30 MIN: CPT | Performed by: FAMILY MEDICINE

## 2018-08-22 NOTE — PROGRESS NOTES
CC: Annual Physical Exam    HPI:   Dev Petty is a 45year old female who presents for a complete physical exam.  Patient complains of muscle pains/.     Lipids- increase triglycerides  Discussed with patient diet exercise and medications.   Patient MCG/ACT Nasal Suspension 2 sprays by Each Nare route daily. Disp: 3 Bottle Rfl: 0   B Complex-C-Folic Acid (EQL B COMPLEX) Oral Tab Take 1 tablet by mouth daily.  Disp:  Rfl:    Albuterol Sulfate  (90 Base) MCG/ACT Inhalation Aero Soln Inhale 2 puffs Smokeless tobacco: Never Used                        Alcohol use: Yes           0.0 oz/week       Comment: 2 per month    Drug use: No            Other Topics            Concern  Caffeine Concern SpO2 99%   BMI 26.99 kg/m²  Estimated body mass index is 26.99 kg/m² as calculated from the following:    Height as of this encounter: 60\". Weight as of this encounter: 138 lb 3.2 oz. Vital signs reviewed. Appears stated age, well groomed.   Physical E WITH DIFFERENTIAL WITH PLATELET; Future  - COMP METABOLIC PANEL (14); Future  - LIPID PANEL; Future  - ASSAY, THYROID STIM HORMONE; Future  - VITAMIN D, 25-HYDROXY; Future  - VITAMIN B12; Future    2.  Hyperlipidemia, unspecified hyperlipidemia type    - CB PPSV23/PCV13 Highest Risk Adult(1 of 3 - PCV13) due on 12/15/1982  FIT Colorectal Screening due on 06/24/2017  Annual Physical due on 07/19/2018     Discussed diet and exercise. Pt' s weight is Body mass index is 26.99 kg/m². , recommended low fat diet a

## 2018-08-22 NOTE — PATIENT INSTRUCTIONS
Rec fastng labs in November  F.u appt after labs in NOvember    Decrease fish oil    Continue lopid   Increase niacin to 2 x a day- take baby asprin 30 min before night time dose      Continue complex b vitamins    Continue exercise  PT eval gluteal strain

## 2018-08-27 ENCOUNTER — TELEPHONE (OUTPATIENT)
Dept: FAMILY MEDICINE CLINIC | Facility: CLINIC | Age: 53
End: 2018-08-27

## 2018-08-27 NOTE — TELEPHONE ENCOUNTER
Patient states she cut her finger on a mandolin/slicer this morning. States the tip of her finger \"came off. \"  Patient states she wrapped it in gauze and put a bandage on it.   Patient afraid when she takes the bandage off it is going to \"rip off\" the

## 2018-08-29 ENCOUNTER — OFFICE VISIT (OUTPATIENT)
Dept: FAMILY MEDICINE CLINIC | Facility: CLINIC | Age: 53
End: 2018-08-29
Payer: COMMERCIAL

## 2018-08-29 VITALS
HEART RATE: 64 BPM | TEMPERATURE: 97 F | WEIGHT: 140.63 LBS | DIASTOLIC BLOOD PRESSURE: 82 MMHG | SYSTOLIC BLOOD PRESSURE: 110 MMHG | RESPIRATION RATE: 16 BRPM | BODY MASS INDEX: 27 KG/M2

## 2018-08-29 DIAGNOSIS — S61.222A LACERATION OF RIGHT MIDDLE FINGER WITH FOREIGN BODY WITHOUT DAMAGE TO NAIL, INITIAL ENCOUNTER: Primary | ICD-10-CM

## 2018-08-29 PROCEDURE — 99213 OFFICE O/P EST LOW 20 MIN: CPT | Performed by: NURSE PRACTITIONER

## 2018-08-29 PROCEDURE — 90715 TDAP VACCINE 7 YRS/> IM: CPT | Performed by: NURSE PRACTITIONER

## 2018-08-29 PROCEDURE — 90471 IMMUNIZATION ADMIN: CPT | Performed by: NURSE PRACTITIONER

## 2018-08-29 NOTE — PATIENT INSTRUCTIONS
Keep pressure dressing on for today. Remove tomorrow. Ok to apply bacitracin to the wound. Keep finger clean and dry. Tetanus booster given. Follow-up as needed.

## 2018-08-29 NOTE — PROGRESS NOTES
HPI:    Patient ID: Liseth Short is a 45year old female. HPI     Yesterday cut the tip of finger on a slicer. Applied a dressing and took it off this morning. Part of the cotton on the dressing suck to the finger.  States unable to get it off witho Allergies: Other                   HIVES    Comment:protein bars   PHYSICAL EXAM:   Physical Exam   Constitutional: She is oriented to person, place, and time. She appears well-developed and well-nourished. No distress.    Musculoskeletal: Normal range of

## 2018-09-04 RX ORDER — OMEGA-3-ACID ETHYL ESTERS 1 G/1
CAPSULE, LIQUID FILLED ORAL
Qty: 360 CAPSULE | Refills: 0 | OUTPATIENT
Start: 2018-09-04

## 2018-09-04 RX ORDER — OMEGA-3-ACID ETHYL ESTERS 1 G/1
CAPSULE, LIQUID FILLED ORAL
Qty: 360 CAPSULE | Refills: 3 | Status: SHIPPED | OUTPATIENT
Start: 2018-09-04 | End: 2019-10-06

## 2018-09-04 RX ORDER — GEMFIBROZIL 600 MG/1
TABLET, FILM COATED ORAL
Qty: 180 TABLET | Refills: 1 | Status: SHIPPED | OUTPATIENT
Start: 2018-09-04 | End: 2019-04-21

## 2018-09-04 NOTE — TELEPHONE ENCOUNTER
Future appt:    Last Appointment:  8/22/2018-  Pt will be due for labs and f/u in November    Lovaza refilled 6/4/18 for #360  -     Gemfibrozil refilled 7/9/18 for #180    CHOLESTEROL (no units)   Date Value   11/20/2017 188   ----------  Cholesterol, Tot

## 2018-10-23 ENCOUNTER — TELEPHONE (OUTPATIENT)
Dept: FAMILY MEDICINE CLINIC | Facility: CLINIC | Age: 53
End: 2018-10-23

## 2018-10-23 RX ORDER — IBUPROFEN 400 MG/1
400 TABLET ORAL 3 TIMES DAILY
COMMUNITY
End: 2020-11-25

## 2018-10-23 NOTE — TELEPHONE ENCOUNTER
Pt can increase ibuprophen to 400mg 3x a day WITH MEALS for 2 weeks- if no improvement, then appointment advised.

## 2018-10-23 NOTE — TELEPHONE ENCOUNTER
Pt was given referral to PT back in Aug due to gluteal pain/ strain. Pt states she has been going 2 times per week since Sept.  States s/s are better, but still having problems, states left side is worse.   Pt states she is able to sleep at night  PT progr

## 2018-11-08 ENCOUNTER — TELEPHONE (OUTPATIENT)
Dept: FAMILY MEDICINE CLINIC | Facility: CLINIC | Age: 53
End: 2018-11-08

## 2018-11-08 NOTE — TELEPHONE ENCOUNTER
Yes is okay to wait till tomorrow–please asked patient if she saw Dr. Morelia Wilkinson  for back pain–if she did not see her for back pain, then she needs an appointment.   I know that I did see her back in June, but that has been more than 3 months ago she would

## 2018-11-08 NOTE — TELEPHONE ENCOUNTER
Pt states that PT wants an X-ray of back and pelvis to check for arthritis in the SI joint. Pt informed that Dr. Sadaf Daniels is out of the office and this may have to wait until tomorrow. Please advise.

## 2018-11-08 NOTE — TELEPHONE ENCOUNTER
Physical therapist requesting Jovanna Rich  ( was seen there 11/6 )  for back and pelvis   - wants to schedule here ASAP

## 2018-11-12 ENCOUNTER — HOSPITAL ENCOUNTER (OUTPATIENT)
Dept: GENERAL RADIOLOGY | Age: 53
Discharge: HOME | End: 2018-11-12
Attending: NURSE PRACTITIONER
Payer: COMMERCIAL

## 2018-11-12 ENCOUNTER — TELEPHONE (OUTPATIENT)
Dept: FAMILY MEDICINE CLINIC | Facility: CLINIC | Age: 53
End: 2018-11-12

## 2018-11-12 ENCOUNTER — OFFICE VISIT (OUTPATIENT)
Dept: FAMILY MEDICINE CLINIC | Facility: CLINIC | Age: 53
End: 2018-11-12
Payer: COMMERCIAL

## 2018-11-12 VITALS
HEIGHT: 60 IN | BODY MASS INDEX: 29.36 KG/M2 | DIASTOLIC BLOOD PRESSURE: 84 MMHG | RESPIRATION RATE: 20 BRPM | TEMPERATURE: 98 F | OXYGEN SATURATION: 98 % | HEART RATE: 61 BPM | WEIGHT: 149.56 LBS | SYSTOLIC BLOOD PRESSURE: 120 MMHG

## 2018-11-12 DIAGNOSIS — G89.29 CHRONIC LEFT-SIDED LOW BACK PAIN WITHOUT SCIATICA: Primary | ICD-10-CM

## 2018-11-12 DIAGNOSIS — M53.3 SACROILIAC PAIN: ICD-10-CM

## 2018-11-12 DIAGNOSIS — G89.29 CHRONIC LEFT-SIDED LOW BACK PAIN WITHOUT SCIATICA: ICD-10-CM

## 2018-11-12 DIAGNOSIS — M54.50 CHRONIC LEFT-SIDED LOW BACK PAIN WITHOUT SCIATICA: ICD-10-CM

## 2018-11-12 DIAGNOSIS — M54.50 CHRONIC LEFT-SIDED LOW BACK PAIN WITHOUT SCIATICA: Primary | ICD-10-CM

## 2018-11-12 PROCEDURE — 99214 OFFICE O/P EST MOD 30 MIN: CPT | Performed by: NURSE PRACTITIONER

## 2018-11-12 PROCEDURE — 72110 X-RAY EXAM L-2 SPINE 4/>VWS: CPT | Performed by: NURSE PRACTITIONER

## 2018-11-12 RX ORDER — IVERMECTIN 10 MG/G
CREAM TOPICAL
Refills: 11 | COMMUNITY
Start: 2018-10-16 | End: 2020-02-14

## 2018-11-12 NOTE — TELEPHONE ENCOUNTER
Pt scheduled for appt today with EL.     Future Appointments   Date Time Provider Vicente Estrada   11/12/2018  1:15 PM DAREN Kunz [facility] [place] [facility]

## 2018-11-12 NOTE — PATIENT INSTRUCTIONS
Rest, recommend supportive shoes with no heel, ibuprofen 400-600 mg 1 by mouth 3 times a day with food or Aleve 1-2 pills twice a day with food. Follow-up with physical therapy. Follow-up if symptoms persist or increase.

## 2018-11-12 NOTE — PROGRESS NOTES
Asya Khan is a 45year old female.   Patient presents with:  Low Back Pain: SI joint, is going to therapy and needs an x-ray to see if she has arthritis      HPI:   Complaints of pain to Left SI joint-  Was both sides - now mostly left   Request for mg by mouth 3 (three) times daily. Disp:  Rfl:    Multiple Vitamins-Minerals (CENTRUM SILVER) Oral Tab Take 1 tablet by mouth daily.    Disp:  Rfl:    fluticasone-salmeterol 100-50 MCG/DOSE Inhalation Aerosol Powder, Breath Activated Inhale 1 puff into th tendon reflexes to bilateral lower extremities, +5/5 strength to bilateral lower extremities.   Sensation intact  ASSESSMENT AND PLAN:     Chronic left-sided low back pain without sciatica  (primary encounter diagnosis)  Sacroiliac pain    No orders of the

## 2018-11-12 NOTE — TELEPHONE ENCOUNTER
----- Message from DAREN Leonard sent at 11/12/2018  3:45 PM CST -----  Please fax x-ray report to [address] [place] [address] physical therapy–patient was notified via my chart message as below-  Radiology report is consistent with my findings.   We will fa

## 2018-12-04 NOTE — TELEPHONE ENCOUNTER
Note sent to [facility] NP since she saw her and prescribed the antibiotic third trimester O80.200    Noncompliant pregnant patient in third trimester O09.893, Z91.19    Abnormal glucose tolerance test R73.02    IUP (intrauterine pregnancy), incidental Z34.90       Past Medical History:  History reviewed. No pertinent past medical history. Past Surgical History:        Procedure Laterality Date    TONSILLECTOMY         Social History:    Social History   Substance Use Topics    Smoking status: Current Every Day Smoker     Packs/day: 0.50     Types: Cigarettes    Smokeless tobacco: Never Used      Comment: \"1cig/day\" 11/13/2018    Alcohol use No                                Ready to quit: Not Answered  Counseling given: Not Answered      Vital Signs (Current):   Vitals:    12/03/18 1837 12/03/18 1840 12/03/18 1910 12/03/18 1922   BP: 116/65  115/64    Pulse: 100  98    Resp: 16      Temp:   97.2 °F (36.2 °C)    TempSrc:       SpO2: 93% 98%     Weight:    203 lb (92.1 kg)   Height:    5' 7\" (1.702 m)                                              BP Readings from Last 3 Encounters:   12/03/18 115/64   12/03/18 112/66   11/29/18 122/74       NPO Status: Time of last liquid consumption: 1800                        Time of last solid consumption: 1800                        Date of last liquid consumption: 12/03/18                        Date of last solid food consumption: 12/03/18    BMI:   Wt Readings from Last 3 Encounters:   12/03/18 203 lb (92.1 kg)   11/29/18 203 lb 9.6 oz (92.4 kg)   11/19/18 196 lb 9.6 oz (89.2 kg)     Body mass index is 31.79 kg/m².     CBC:   Lab Results   Component Value Date    WBC 14.3 12/03/2018    RBC 3.91 12/03/2018    HGB 11.2 12/03/2018    HCT 34.4 12/03/2018    MCV 87.9 12/03/2018    RDW 13.4 12/03/2018     12/03/2018       CMP:   Lab Results   Component Value Date     10/23/2013    K 4.2 10/23/2013     10/23/2013    CO2 26 10/23/2013    BUN 12 10/23/2013    CREATININE 0.77 10/23/2013    GFRAA NOT REPORTED 10/23/2013    LABGLOM

## 2019-01-11 ENCOUNTER — HOSPITAL ENCOUNTER (OUTPATIENT)
Dept: GENERAL RADIOLOGY | Age: 54
Discharge: HOME | End: 2019-01-11
Attending: FAMILY MEDICINE
Payer: COMMERCIAL

## 2019-01-11 ENCOUNTER — TELEPHONE (OUTPATIENT)
Dept: FAMILY MEDICINE CLINIC | Facility: CLINIC | Age: 54
End: 2019-01-11

## 2019-01-11 ENCOUNTER — OFFICE VISIT (OUTPATIENT)
Dept: FAMILY MEDICINE CLINIC | Facility: CLINIC | Age: 54
End: 2019-01-11
Payer: COMMERCIAL

## 2019-01-11 VITALS
DIASTOLIC BLOOD PRESSURE: 60 MMHG | HEART RATE: 56 BPM | RESPIRATION RATE: 16 BRPM | SYSTOLIC BLOOD PRESSURE: 110 MMHG | OXYGEN SATURATION: 99 % | WEIGHT: 149.63 LBS | HEIGHT: 60 IN | BODY MASS INDEX: 29.38 KG/M2 | TEMPERATURE: 98 F

## 2019-01-11 DIAGNOSIS — M25.531 RIGHT WRIST PAIN: Primary | ICD-10-CM

## 2019-01-11 DIAGNOSIS — S40.021A ARM BRUISE, RIGHT, INITIAL ENCOUNTER: ICD-10-CM

## 2019-01-11 DIAGNOSIS — M25.531 RIGHT WRIST PAIN: ICD-10-CM

## 2019-01-11 DIAGNOSIS — T07.XXXA ABRASIONS OF MULTIPLE SITES: ICD-10-CM

## 2019-01-11 PROCEDURE — 99214 OFFICE O/P EST MOD 30 MIN: CPT | Performed by: FAMILY MEDICINE

## 2019-01-11 PROCEDURE — 73110 X-RAY EXAM OF WRIST: CPT | Performed by: FAMILY MEDICINE

## 2019-01-11 NOTE — TELEPHONE ENCOUNTER
Patient fell last week, wrist is bruised  and wants to check her ribs as well. Wants appointment for today no openings.

## 2019-01-11 NOTE — TELEPHONE ENCOUNTER
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 Pt states she didn't see the empty pallets sitting on the ground, pt tripped and fell over them and landed on the pallets.   Pt has pain in right wrist, feels that she pulled her R axillary area, but

## 2019-01-12 NOTE — PROGRESS NOTES
[address]  PROGRESS NOTE  Chief Complaint:   Patient presents with:  Fall: Patient [place] a fall at [employer], right side wrist pain and bruising.  Patient also stated she is having pain under the right arm  Other: Has a few spots on both legs pain or any shortness of breath. Patient's last tetanus vaccine was in 2018 and is up-to-date.     Results for orders placed or performed in visit on 12/05/18   CBC, NO DIFFERENTIAL/PLATELET   Result Value Ref Range    WBC 4.1 /hpf    RBC 4.6 /hpf    [facility])    • Hyperglycemia    • Hypertriglyceridemia 1973     Past Surgical History:   Procedure Laterality Date   • BREAST RECONSTRUCTION      right 2017   • COLONOSCOPY  8/2014   • MASTECTOMY LEFT  2014   • MASTECTOMY MODIFIED RADICAL Bilateral    • TUBAL Disp:  Rfl: 3   anastrozole 1 MG Oral Tab tab Take 1 mg by mouth daily. Disp:  Rfl:    Calcium Carb-Cholecalciferol (CALCIUM 1000 + D) 1000-800 MG-UNIT Oral Tab daily.  One daily  Disp:  Rfl:    gabapentin 300 MG Oral Cap Take 300 mg by mouth 3 (three) ti calculated from the following:    Height as of this encounter: 60\". Weight as of this encounter: 149 lb 9.6 oz. Vital signs reviewed. Appears stated age, well groomed.   Physical Exam:  GEN:  Patient is alert, awake and oriented, well developed, well n for evaluation following a fall while exiting the [employer] 3 days ago. X-rays were completed of the right wrist and did not reveal any fracture just the significant abrasion tenderness and bruising present findings consistent with a bone bruise.   Marianela

## 2019-04-04 ENCOUNTER — OFFICE VISIT (OUTPATIENT)
Dept: FAMILY MEDICINE CLINIC | Facility: CLINIC | Age: 54
End: 2019-04-04
Payer: COMMERCIAL

## 2019-04-04 VITALS
TEMPERATURE: 97 F | HEART RATE: 68 BPM | BODY MASS INDEX: 29.28 KG/M2 | SYSTOLIC BLOOD PRESSURE: 110 MMHG | DIASTOLIC BLOOD PRESSURE: 72 MMHG | HEIGHT: 60 IN | RESPIRATION RATE: 18 BRPM | OXYGEN SATURATION: 99 % | WEIGHT: 149.13 LBS

## 2019-04-04 DIAGNOSIS — H10.33 ACUTE BACTERIAL CONJUNCTIVITIS OF BOTH EYES: Primary | ICD-10-CM

## 2019-04-04 PROCEDURE — 99213 OFFICE O/P EST LOW 20 MIN: CPT | Performed by: NURSE PRACTITIONER

## 2019-04-04 RX ORDER — TOBRAMYCIN 3 MG/ML
1 SOLUTION/ DROPS OPHTHALMIC 3 TIMES DAILY
Qty: 1 BOTTLE | Refills: 0 | Status: SHIPPED | OUTPATIENT
Start: 2019-04-04 | End: 2019-04-09

## 2019-04-04 NOTE — PROGRESS NOTES
CHIEF COMPLAINT:   Patient presents with:  Redness: Left eye - itching, watery  Cough      HPI:   German Dailey is a 52year old female who presents to clinic today with complaints of left eye draining and red since Sunday night - right ey started ge Rfl: 3   anastrozole 1 MG Oral Tab tab Take 1 mg by mouth daily. Disp:  Rfl:    Calcium Carb-Cholecalciferol (CALCIUM 1000 + D) 1000-800 MG-UNIT Oral Tab daily.  One daily  Disp:  Rfl:    gabapentin 300 MG Oral Cap Take 300 mg by mouth 3 (three) times da problems symptoms are consistent with  ASSESSMENT:  Acute bacterial conjunctivitis of both eyes  (primary encounter diagnosis)    PLAN: Meds as listed below.   Comfort measures as described in Patient Instructions  Patient Instructions   pink eye is contagi

## 2019-04-04 NOTE — PATIENT INSTRUCTIONS
pink eye is contagious and you should avoid touching eyes. Wash your hands frequently, do not share face towels. You are considered to be contagious for 24hrs or until there is no more discharge.

## 2019-04-22 RX ORDER — GEMFIBROZIL 600 MG/1
TABLET, FILM COATED ORAL
Qty: 180 TABLET | Refills: 0 | Status: SHIPPED | OUTPATIENT
Start: 2019-04-22 | End: 2019-07-10

## 2019-04-22 NOTE — TELEPHONE ENCOUNTER
Future appt:    Last Appointment: 8/22/18 physical  Cholesterol, Total (no units)   Date Value   11/19/2018 190     HDL Cholesterol (mg/dL)   Date Value   11/19/2018 34 (L)     LDL Cholesterol (mg/dL)   Date Value   11/19/2018 103 (H)     Triglycerides (mg

## 2019-06-18 ENCOUNTER — OFFICE VISIT (OUTPATIENT)
Dept: FAMILY MEDICINE CLINIC | Facility: CLINIC | Age: 54
End: 2019-06-18
Payer: COMMERCIAL

## 2019-06-18 VITALS
SYSTOLIC BLOOD PRESSURE: 122 MMHG | HEART RATE: 58 BPM | RESPIRATION RATE: 16 BRPM | TEMPERATURE: 98 F | HEIGHT: 60 IN | BODY MASS INDEX: 27.29 KG/M2 | DIASTOLIC BLOOD PRESSURE: 84 MMHG | OXYGEN SATURATION: 99 % | WEIGHT: 139 LBS

## 2019-06-18 DIAGNOSIS — R35.0 FREQUENCY OF URINATION: Primary | ICD-10-CM

## 2019-06-18 DIAGNOSIS — N30.00 ACUTE CYSTITIS WITHOUT HEMATURIA: ICD-10-CM

## 2019-06-18 PROCEDURE — 99214 OFFICE O/P EST MOD 30 MIN: CPT | Performed by: NURSE PRACTITIONER

## 2019-06-18 PROCEDURE — 81001 URINALYSIS AUTO W/SCOPE: CPT | Performed by: NURSE PRACTITIONER

## 2019-06-18 RX ORDER — NITROFURANTOIN 25; 75 MG/1; MG/1
100 CAPSULE ORAL 2 TIMES DAILY
Qty: 20 CAPSULE | Refills: 0 | Status: SHIPPED | OUTPATIENT
Start: 2019-06-18 | End: 2019-06-28

## 2019-06-18 NOTE — PROGRESS NOTES
Patient ID:   Dee Dee Howard  is a 52year old female    Allergies    Other                   HIVES    Comment:protein bars  Medications     Nitrofurantoin Monohyd Macro 100 MG Oral Cap Take 1 capsule (100 mg total) by mouth 2 (two) times daily for 10 da mouth daily.    Disp:  Rfl:        HPI:   Kadie Herron is a 52year old female who presents to clinic today with complaints of drove home from [address] [place] [address]- didn't void for a while- had some abdominal pain after voiding- all night long- not as bad tod after intercourse, don't take bubble baths or use scented soaps, don't use powders, keep bowels regular. Follow up if symptoms persist or if you experience flank pain, vomiting, or fever. No follow-ups on file.     Orders Placed This Encounter      UA

## 2019-07-10 RX ORDER — GEMFIBROZIL 600 MG/1
TABLET, FILM COATED ORAL
Qty: 180 TABLET | Refills: 0 | Status: SHIPPED | OUTPATIENT
Start: 2019-07-10 | End: 2019-10-07

## 2019-07-10 NOTE — TELEPHONE ENCOUNTER
Future appt:     Your appointments     Date & Time Appointment Department [facility])    Aug 13, 2019  1:30 PM CDT Physical - Established Patient with Cristal Cohen MD [address] [place] [address] ([facility])

## 2019-08-13 ENCOUNTER — OFFICE VISIT (OUTPATIENT)
Dept: FAMILY MEDICINE CLINIC | Facility: CLINIC | Age: 54
End: 2019-08-13
Payer: COMMERCIAL

## 2019-08-13 VITALS
SYSTOLIC BLOOD PRESSURE: 122 MMHG | DIASTOLIC BLOOD PRESSURE: 78 MMHG | HEIGHT: 60 IN | RESPIRATION RATE: 16 BRPM | OXYGEN SATURATION: 99 % | HEART RATE: 60 BPM | TEMPERATURE: 98 F | BODY MASS INDEX: 27.48 KG/M2 | WEIGHT: 140 LBS

## 2019-08-13 DIAGNOSIS — G63 NEUROPATHY ASSOCIATED WITH CANCER (HCC): ICD-10-CM

## 2019-08-13 DIAGNOSIS — C80.1 NEUROPATHY ASSOCIATED WITH CANCER (HCC): ICD-10-CM

## 2019-08-13 DIAGNOSIS — J45.20 MILD INTERMITTENT ASTHMA WITHOUT COMPLICATION: ICD-10-CM

## 2019-08-13 DIAGNOSIS — E78.5 HYPERLIPIDEMIA, UNSPECIFIED HYPERLIPIDEMIA TYPE: ICD-10-CM

## 2019-08-13 DIAGNOSIS — Z23 NEED FOR VACCINATION: ICD-10-CM

## 2019-08-13 DIAGNOSIS — Z00.00 ANNUAL PHYSICAL EXAM: Primary | ICD-10-CM

## 2019-08-13 DIAGNOSIS — C50.912 MALIGNANT NEOPLASM OF LEFT FEMALE BREAST, UNSPECIFIED ESTROGEN RECEPTOR STATUS, UNSPECIFIED SITE OF BREAST (HCC): ICD-10-CM

## 2019-08-13 DIAGNOSIS — G62.9 NEUROPATHY: ICD-10-CM

## 2019-08-13 PROCEDURE — 88175 CYTOPATH C/V AUTO FLUID REDO: CPT | Performed by: FAMILY MEDICINE

## 2019-08-13 PROCEDURE — 90471 IMMUNIZATION ADMIN: CPT | Performed by: FAMILY MEDICINE

## 2019-08-13 PROCEDURE — 90732 PPSV23 VACC 2 YRS+ SUBQ/IM: CPT | Performed by: FAMILY MEDICINE

## 2019-08-13 PROCEDURE — 99396 PREV VISIT EST AGE 40-64: CPT | Performed by: FAMILY MEDICINE

## 2019-08-13 PROCEDURE — 90750 HZV VACC RECOMBINANT IM: CPT | Performed by: FAMILY MEDICINE

## 2019-08-13 PROCEDURE — 87624 HPV HI-RISK TYP POOLED RSLT: CPT | Performed by: FAMILY MEDICINE

## 2019-08-13 PROCEDURE — 90472 IMMUNIZATION ADMIN EACH ADD: CPT | Performed by: FAMILY MEDICINE

## 2019-08-13 NOTE — PATIENT INSTRUCTIONS
I advise use of amytriptilene nightly for a month trial for neuropathy, continue gabapentin    Vaccines-- pneummonia and shingrix today  F/u shingrix in 2 months    Activity as tolerates    Fasting labs advised

## 2019-08-13 NOTE — PROGRESS NOTES
CC: Annual Physical Exam    HPI:   Mayur Fletcher is a 52year old female who presents for a complete physical exam. Symptoms: is menopausal. Patient complains of continue neurpathy.      Feet- neuropathy, some in hands- can drop fork with meal    Legs s Seen /LPF    Mucous Urine 1+ (A) None Seen    Yeast Urine None Seen None Seen    Non-Squamous Epithelial None Seen None Seen         Current Outpatient Medications:  gemfibrozil 600 MG Oral Tab TAKE 1 TABLET BY MOUTH TWICE DAILY, GENERIC EQUIVALENT FOR LOP Diagnosis Date   • Arthritis    • Asthma    • Breast cancer ([facility]    • Cancer ([facility]    • Hyperglycemia    • Hypertriglyceridemia 1973      Past Surgical History:   Procedure Laterality Date   • BREAST RECONSTRUCTION      right 2017   • COLONOSCOPY  8/201 stool.  MUSCULOSKELETAL:  Denies weakness, muscle aches, back pain, joint pain, swelling or stiffness. NEUROLOGICAL:  Denies headache,dizziness, syncope,   HEMATOLOGIC:  Denies anemia, bleeding or bruising.   LYMPHATICS:  Denies enlarged nodes  PSYCHIATRIC non-tender, no adnexal masses or tenderness, no masses on bimanual exam, bladder not distended. pap done  EXTREMITIES:  No edema, no cyanosis, no clubbing, FROM, 2+ dorsalis pedis pulses bilaterally.   NEURO:  No deficit, normal gait, strength and tone, sens medically stable continue to follow with oncology every 6 months. With use of amitriptyline and gabapentin for neuropathy reviewed with patient.   General health maintenance screening reviewed and updated with patient vaccines recommended for pneumonia --s

## 2019-08-14 ENCOUNTER — TELEPHONE (OUTPATIENT)
Dept: FAMILY MEDICINE CLINIC | Facility: CLINIC | Age: 54
End: 2019-08-14

## 2019-08-14 LAB
ALBUMIN: 4.4 G/DL
ALBUMIN: 4.6 G/DL
ALKALINE PHOSPHATASE: 44
ALKALINE PHOSPHATASE: 53
ALT: 28
ALT: 28
AST: 30
AST: 30
BILIRUBIN, TOTAL: 0.3 MG/DL
BILIRUBIN, TOTAL: 0.5 MG/DL
BUN: 17
BUN: 22
CA 15-3: 13
CA 27.29: 19 U/ML
CALCIUM: 9.3
CALCIUM: 9.4
CHLORIDE: 101
CHLORIDE: 103
CREATININE: 0.9 MG/DL
CREATININE: 1 MG/DL
GFR NON-AFRICAN AMERICAN: 61
GFR NON-AFRICAN AMERICAN: 69
GLUCOSE BLOOD: 88
GLUCOSE BLOOD: 89
HEMATOCRIT: 43.8 %
HEMOGLOBIN: 14 G/DL (ref 12–15)
HPV I/H RISK 1 DNA SPEC QL NAA+PROBE: NEGATIVE
MCH: 30 PG
MCHC: 32 G/DL
MCV: 93 FL
MPV: 11
PLATELETS: 230 X10E3/UL
POTASSIUM: 4.2
POTASSIUM: 4.5
RBC: 4.7 /HPF
RDW: 13 %
SODIUM: 141
SODIUM: 143
TOTAL PROTEIN: 6.6
TOTAL PROTEIN: 6.8
WBC: 3.3 /HPF

## 2019-08-14 NOTE — TELEPHONE ENCOUNTER
Pt seen 8/13/19. Pt had requested that we obtain previous labs done with oncologist and add any needed lab orders. Pt had CMP drawn on 5/20/19 and pt had CBC, CMP, CA 15-3, and CA 27.29 on 3/20/19. Entered .     Pt mentioned yesterday that she would have

## 2019-08-16 ENCOUNTER — TELEPHONE (OUTPATIENT)
Dept: FAMILY MEDICINE CLINIC | Facility: CLINIC | Age: 54
End: 2019-08-16

## 2019-08-16 NOTE — TELEPHONE ENCOUNTER
----- Message from Park Alvares MD sent at 8/16/2019  1:50 PM CDT -----  Negative pap, negative HPV screen.  Repeat 3 years, sooner if concerns

## 2019-08-27 ENCOUNTER — OFFICE VISIT (OUTPATIENT)
Dept: FAMILY MEDICINE CLINIC | Facility: CLINIC | Age: 54
End: 2019-08-27
Payer: COMMERCIAL

## 2019-08-27 VITALS
BODY MASS INDEX: 26.5 KG/M2 | DIASTOLIC BLOOD PRESSURE: 80 MMHG | SYSTOLIC BLOOD PRESSURE: 126 MMHG | TEMPERATURE: 97 F | HEART RATE: 68 BPM | RESPIRATION RATE: 16 BRPM | HEIGHT: 60 IN | WEIGHT: 135 LBS

## 2019-08-27 DIAGNOSIS — H10.33 ACUTE BACTERIAL CONJUNCTIVITIS OF BOTH EYES: Primary | ICD-10-CM

## 2019-08-27 PROCEDURE — 99214 OFFICE O/P EST MOD 30 MIN: CPT | Performed by: NURSE PRACTITIONER

## 2019-08-27 RX ORDER — TOBRAMYCIN 3 MG/ML
1 SOLUTION/ DROPS OPHTHALMIC EVERY 4 HOURS
Qty: 5 ML | Refills: 0 | Status: SHIPPED | OUTPATIENT
Start: 2019-08-27 | End: 2020-02-14

## 2019-08-27 NOTE — PATIENT INSTRUCTIONS
Use the drops to both eyes for the next 7 days. If develops any visual changes, call and I'll refer to ophthalmologist.     Otherwise follow-up as needed.

## 2019-08-27 NOTE — PROGRESS NOTES
HPI:    Patient ID: Kadie Herron is a 52year old female. HPI    Started over the weekend after cleaning out her closet. Eyes itching and goopy. Also burning. No vision changes. No nasal congestion. Woke up with eyes crusted shut.      Review of Sys Take 10 mg by mouth nightly. Disp:  Rfl: 3   anastrozole 1 MG Oral Tab tab Take 1 mg by mouth daily. Disp:  Rfl:    Calcium Carb-Cholecalciferol (CALCIUM 1000 + D) 1000-800 MG-UNIT Oral Tab daily.  One daily  Disp:  Rfl:    gabapentin 300 MG Oral Cap Ta every 4 (four) hours. Imaging & Referrals:  None      Patient Instructions   Use the drops to both eyes for the next 7 days. If develops any visual changes, call and I'll refer to ophthalmologist.     Otherwise follow-up as needed.           ID#[id number]

## 2019-09-19 ENCOUNTER — TELEPHONE (OUTPATIENT)
Dept: FAMILY MEDICINE CLINIC | Facility: CLINIC | Age: 54
End: 2019-09-19

## 2019-09-19 NOTE — TELEPHONE ENCOUNTER
Please clarify, Is it possible this is from her oncologist?    Pt does need colon screening-- home kit, cologuard or colonoscopy.

## 2019-09-19 NOTE — TELEPHONE ENCOUNTER
Did [facility] test?   [facility] sent a letter telling her there has been an order for this test to be done there. She knew nothing about this order.

## 2019-09-20 NOTE — TELEPHONE ENCOUNTER
Pt informed. Pt mentioned that she had colonoscopy with Dr. Ramsey Dear approx. 5 years ago. Will fax request to obtain report. Pt agreed to contact oncology re: colon screening.

## 2019-09-23 NOTE — TELEPHONE ENCOUNTER
Copy of colonoscopy report with letter from Dr. Sunitha Gray was received. Pt had colon 8/2014. Was recommended then to repeat colon in 10 years, but pt would need to check stools in 5 years. Pt is due for hemoccult test at this time.   Left message for pt

## 2019-10-07 RX ORDER — GEMFIBROZIL 600 MG/1
TABLET, FILM COATED ORAL
Qty: 180 TABLET | Refills: 1 | Status: SHIPPED | OUTPATIENT
Start: 2019-10-07 | End: 2020-06-04

## 2019-10-07 RX ORDER — OMEGA-3-ACID ETHYL ESTERS 1 G/1
CAPSULE, LIQUID FILLED ORAL
Qty: 360 CAPSULE | Refills: 3 | Status: SHIPPED | OUTPATIENT
Start: 2019-10-07 | End: 2021-02-16

## 2019-10-07 NOTE — TELEPHONE ENCOUNTER
Future appt:    Last Appointment with provider:   8/13/2019; No f/u recommended    Last appointment at [facility]:  8/27/2019  Cholesterol, Total (no units)   Date Value   11/19/2018 190     HDL Cholesterol (mg/dL)   Date Value   11/19/2018 34 (L)     LDL Cholesterol (mg/dL)   Date Value   11/19/2018 103 (H)     Triglycerides (mg/dL)   Date Value   11/19/2018 264 (H)   08/09/2018 336 (H)   07/17/2017 225     No results found for: EAG, A1C  Lab Results   Component Value Date    TSH 3.26 11/19/2018     Last RF:  9/4/2018    No follow-ups on file.

## 2019-10-07 NOTE — TELEPHONE ENCOUNTER
Future appt:    Last Appointment with provider:   8/13/2019  Last appointment at [facility]:  8/27/2019  Last px on file:  8/13/19    Gemfibrozil refilled on 7/10/19 for #180      Cholesterol, Total (no units)   Date Value   11/19/2018 190     HDL Choles

## 2019-10-23 ENCOUNTER — NURSE ONLY (OUTPATIENT)
Dept: FAMILY MEDICINE CLINIC | Facility: CLINIC | Age: 54
End: 2019-10-23
Payer: COMMERCIAL

## 2019-10-23 PROCEDURE — 90750 HZV VACC RECOMBINANT IM: CPT | Performed by: FAMILY MEDICINE

## 2019-10-23 PROCEDURE — 90471 IMMUNIZATION ADMIN: CPT | Performed by: FAMILY MEDICINE

## 2019-11-25 ENCOUNTER — TELEPHONE (OUTPATIENT)
Dept: FAMILY MEDICINE CLINIC | Facility: CLINIC | Age: 54
End: 2019-11-25

## 2019-11-26 ENCOUNTER — TELEPHONE (OUTPATIENT)
Dept: FAMILY MEDICINE CLINIC | Facility: CLINIC | Age: 54
End: 2019-11-26

## 2019-11-26 NOTE — TELEPHONE ENCOUNTER
Labs for Feb  - Please send orders to Dr. Yolanda Colón.   She will call at schedule to have both 's labs done in Feb.     Recently had blood work done for both Dr's but forgot to fast.

## 2019-11-27 RX ORDER — ATORVASTATIN CALCIUM 20 MG/1
20 TABLET, FILM COATED ORAL NIGHTLY
Qty: 90 TABLET | Refills: 0 | Status: SHIPPED | OUTPATIENT
Start: 2019-11-27 | End: 2020-11-25

## 2019-11-27 NOTE — TELEPHONE ENCOUNTER
Future appt:    Last Appointment with provider:   8/13/2019  Last appointment at [facility]:  8/27/2019  Cholesterol, Total (no units)   Date Value   11/19/2018 190     HDL Cholesterol (mg/dL)   Date Value   11/19/2018 34 (L)     LDL Cholesterol (mg/dL)

## 2020-01-08 ENCOUNTER — TELEPHONE (OUTPATIENT)
Dept: FAMILY MEDICINE CLINIC | Facility: CLINIC | Age: 55
End: 2020-01-08

## 2020-01-08 DIAGNOSIS — E78.1 PURE HYPERTRIGLYCERIDEMIA: Primary | ICD-10-CM

## 2020-01-08 NOTE — TELEPHONE ENCOUNTER
Pt states she had labs with Dr. Andrew Nation in November, pt did not fast.  Pt would like to repeat labs in Feb (in 3 months), pt would like to have done at [facility]. Pt will have report fowarded.

## 2020-01-14 LAB
ALBUMIN: 4.4 G/DL
ALKALINE PHOSPHATASE: 56
ALT: 15
AST: 19
BILIRUBIN, TOTAL: 0.2 MG/DL
BUN: 23
CA 15-3: 14
CA 27.29: 20 U/ML
CALCIUM: 9.2
CHLORIDE: 102
CHOL/HDL RATIO: 7.9
CREATININE: 0.7 MG/DL
GFR NON-AFRICAN AMERICAN: 92
GLUCOSE, URINE: NEGATIVE
GLUCOSE: 100
HCT: 42.9
HDL CHOLESTEROL: 26 MG/DL
HGB: 13.7
MAGNESIUM: 2.1
MEAN CELL VOLUME: 93
MEAN CORPUSCULAR HEMOGLOBIN: 30
MEAN CORPUSCULAR HGB CONC: 32
MEAN PLATELET VOLUME: 11.3
NON HDL CHOL: 180
PLT: 283
POTASSIUM: 4.3
RED BLOOD COUNT: 4.6
RED CELL DISTRIBUTION WIDTH: 13
SODIUM: 140
SPECIFIC GRAVITY: 1.02
TOTAL CHOLESTEROL: 206 MG/DL
TOTAL PROTEIN: 6.6
TRIGLYCERIDES: 945 MG/DL
TSH: 4.77 UIU/ML
URINE BILIRUBIN: NEGATIVE
URINE BLOOD: NEGATIVE
URINE CLARITY: CLEAR
URINE KETONES: NEGATIVE MG/DL
URINE NITRITE: NEGATIVE
URINE PH: 5
URINE PROTEIN: NEGATIVE MG /DL
URINE-COLOR: YELLOW
UROBILINOGEN: <2
VITAMIN B12: 502 PG/ML
VITAMIN D, 25-HYDROXY: 25.5 NG/ML
WBC: 4.7

## 2020-01-14 NOTE — TELEPHONE ENCOUNTER
Pt called back yesterday re: her lab report . Called pt back, no lab report from Dr. Aneta Laurent noted.   Awaiting fax reporrt

## 2020-01-15 NOTE — TELEPHONE ENCOUNTER
Labs entered in to patient chart reviewed  Chol 206, triglycerides 945. I would highly encourage patient to monify diet and exercise 3-5 x a week. Avoiding alcohol can also help lower triglycerides.  Pt can have repeat lipids and chemistry panel after 2/23/

## 2020-01-15 NOTE — TELEPHONE ENCOUNTER
Pt had labs drawn on 11/23/19 with Dr. Carlos Law. Entered for CR review. Pt had CMP, CBC, Lipid panel, UA, Mag, Vitamin D, Vitamin B12, TSH, CA 15-3 and CA 27.29 collected. Pt asked about repeating Lipid- due to pt not fasting in November?

## 2020-02-14 ENCOUNTER — OFFICE VISIT (OUTPATIENT)
Dept: FAMILY MEDICINE CLINIC | Facility: CLINIC | Age: 55
End: 2020-02-14
Payer: COMMERCIAL

## 2020-02-14 ENCOUNTER — TELEPHONE (OUTPATIENT)
Dept: FAMILY MEDICINE CLINIC | Facility: CLINIC | Age: 55
End: 2020-02-14

## 2020-02-14 VITALS
TEMPERATURE: 97 F | HEART RATE: 65 BPM | HEIGHT: 60 IN | WEIGHT: 135 LBS | OXYGEN SATURATION: 97 % | BODY MASS INDEX: 26.5 KG/M2 | SYSTOLIC BLOOD PRESSURE: 112 MMHG | DIASTOLIC BLOOD PRESSURE: 80 MMHG

## 2020-02-14 DIAGNOSIS — N30.90 CYSTITIS WITHOUT HEMATURIA: ICD-10-CM

## 2020-02-14 DIAGNOSIS — R30.0 DYSURIA: Primary | ICD-10-CM

## 2020-02-14 LAB
APPEARANCE: CLEAR
BILIRUB UR QL STRIP.AUTO: NEGATIVE
CLARITY UR REFRACT.AUTO: CLEAR
GLUCOSE UR STRIP.AUTO-MCNC: NEGATIVE MG/DL
KETONES UR STRIP.AUTO-MCNC: NEGATIVE MG/DL
MULTISTIX LOT#: NORMAL NUMERIC
NITRITE UR QL STRIP.AUTO: POSITIVE
PH UR STRIP.AUTO: 7 [PH] (ref 4.5–8)
PH, URINE: 5.5 (ref 4.5–8)
PROT UR STRIP.AUTO-MCNC: NEGATIVE MG/DL
RBC UR QL AUTO: NEGATIVE
SP GR UR STRIP.AUTO: <1.005 (ref 1–1.03)
SPECIFIC GRAVITY: <=1.005 (ref 1–1.03)
UROBILINOGEN UR STRIP.AUTO-MCNC: <2 MG/DL
UROBILINOGEN,SEMI-QN: 0.2 MG/DL (ref 0–1.9)

## 2020-02-14 PROCEDURE — 87086 URINE CULTURE/COLONY COUNT: CPT | Performed by: NURSE PRACTITIONER

## 2020-02-14 PROCEDURE — 81001 URINALYSIS AUTO W/SCOPE: CPT | Performed by: NURSE PRACTITIONER

## 2020-02-14 PROCEDURE — 99213 OFFICE O/P EST LOW 20 MIN: CPT | Performed by: NURSE PRACTITIONER

## 2020-02-14 PROCEDURE — 81003 URINALYSIS AUTO W/O SCOPE: CPT | Performed by: NURSE PRACTITIONER

## 2020-02-14 RX ORDER — NITROFURANTOIN 25; 75 MG/1; MG/1
100 CAPSULE ORAL 2 TIMES DAILY
Qty: 10 CAPSULE | Refills: 0 | Status: SHIPPED | OUTPATIENT
Start: 2020-02-14 | End: 2020-02-19

## 2020-02-14 NOTE — TELEPHONE ENCOUNTER
Pt states she woke up with UTI s/s, pain with urination. Pt states she took [address]. Pt asked for treatment over the phone. Pt informed appt is needed. Appt given.     Future Appointments   Date Time Provider Vicente Estrada   2/14/2020 11:00 AM Mar

## 2020-02-14 NOTE — PROGRESS NOTES
Patient ID:   Alessandro Mcmahan  is a 62year old female    Allergies    Other                   HIVES    Comment:protein bars  Medications   Nitrofurantoin Monohyd Macro (MACROBID) 100 MG Oral Cap, Take 1 capsule (100 mg total) by mouth 2 (two) times daily Disp: , Rfl:   Multiple Vitamins-Minerals (CENTRUM SILVER) Oral Tab, Take 1 tablet by mouth daily. , Disp: , Rfl:         HPI:   Heidy Iglesias is a 62year old female who presents to clinic today with complaints of UTI symptoms.     Woke up with burnin Skin: warm and dry, no rash      ASSESSMENT/PLAN:   Dysuria  (primary encounter diagnosis)  Cystitis without hematuria  Positive nitrites and small leukocytes in urine dip. Will send for urinalysis and culture.   Start macrobid 100mg twice a day for five

## 2020-02-14 NOTE — PATIENT INSTRUCTIONS
macrobid 100mg twice a day for five days. Take azo as needed. Will send urine for culture. Urinary tract infection care: Take antibiotic as prescribed  Increase fluid intake  Good khushi care:  Wipe from front to back, use unscented soaps when washing

## 2020-02-17 ENCOUNTER — TELEPHONE (OUTPATIENT)
Dept: FAMILY MEDICINE CLINIC | Facility: CLINIC | Age: 55
End: 2020-02-17

## 2020-02-17 NOTE — TELEPHONE ENCOUNTER
----- Message from ESPERANZA Krueger sent at 2/17/2020  7:20 AM CST -----  No growth on urine culture. Please ask the patient how she is feeling. If improved, continue antibiotic.   If no improvement, may need to return for follow up, possible vagina

## 2020-02-17 NOTE — TELEPHONE ENCOUNTER
----- Message from Tiffany Awad sent at 2/17/2020 12:51 PM CST -----  [facility]  [phone]  Pt is on work trip- requesting message be left on her VM

## 2020-02-17 NOTE — TELEPHONE ENCOUNTER
Spoke to patient regarding below results. Patient states her symptoms have improved. Recommended to continue antibiotic and if symptoms persist or worsen to call for follow up appointment.

## 2020-02-26 ENCOUNTER — LABORATORY ENCOUNTER (OUTPATIENT)
Dept: LAB | Age: 55
End: 2020-02-26
Attending: FAMILY MEDICINE
Payer: COMMERCIAL

## 2020-02-26 DIAGNOSIS — E78.1 PURE HYPERTRIGLYCERIDEMIA: ICD-10-CM

## 2020-02-26 LAB
ALBUMIN SERPL-MCNC: 4 G/DL (ref 3.4–5)
ALBUMIN/GLOB SERPL: 1.1 {RATIO} (ref 1–2)
ALP LIVER SERPL-CCNC: 39 U/L (ref 46–118)
ALT SERPL-CCNC: 30 U/L (ref 13–56)
ANION GAP SERPL CALC-SCNC: 5 MMOL/L (ref 0–18)
AST SERPL-CCNC: 22 U/L (ref 15–37)
BILIRUB SERPL-MCNC: 0.6 MG/DL (ref 0.1–2)
BUN BLD-MCNC: 25 MG/DL (ref 7–18)
BUN/CREAT SERPL: 24.8 (ref 10–20)
CALCIUM BLD-MCNC: 9.1 MG/DL (ref 8.5–10.1)
CHLORIDE SERPL-SCNC: 105 MMOL/L (ref 98–112)
CHOLEST SMN-MCNC: 269 MG/DL (ref ?–200)
CO2 SERPL-SCNC: 29 MMOL/L (ref 21–32)
CREAT BLD-MCNC: 1.01 MG/DL (ref 0.55–1.02)
GLOBULIN PLAS-MCNC: 3.5 G/DL (ref 2.8–4.4)
GLUCOSE BLD-MCNC: 95 MG/DL (ref 70–99)
HDLC SERPL-MCNC: 41 MG/DL (ref 40–59)
LDLC SERPL DIRECT ASSAY-MCNC: 102 MG/DL (ref ?–100)
M PROTEIN MFR SERPL ELPH: 7.5 G/DL (ref 6.4–8.2)
NONHDLC SERPL-MCNC: 228 MG/DL (ref ?–130)
OSMOLALITY SERPL CALC.SUM OF ELEC: 292 MOSM/KG (ref 275–295)
PATIENT FASTING Y/N/NP: YES
PATIENT FASTING Y/N/NP: YES
POTASSIUM SERPL-SCNC: 4 MMOL/L (ref 3.5–5.1)
SODIUM SERPL-SCNC: 139 MMOL/L (ref 136–145)
TRIGL SERPL-MCNC: 547 MG/DL (ref 30–149)

## 2020-02-26 PROCEDURE — 36415 COLL VENOUS BLD VENIPUNCTURE: CPT | Performed by: FAMILY MEDICINE

## 2020-02-26 PROCEDURE — 80053 COMPREHEN METABOLIC PANEL: CPT | Performed by: FAMILY MEDICINE

## 2020-02-26 PROCEDURE — 80061 LIPID PANEL: CPT | Performed by: FAMILY MEDICINE

## 2020-02-26 PROCEDURE — 83721 ASSAY OF BLOOD LIPOPROTEIN: CPT | Performed by: FAMILY MEDICINE

## 2020-05-29 ENCOUNTER — TELEPHONE (OUTPATIENT)
Dept: FAMILY MEDICINE CLINIC | Facility: CLINIC | Age: 55
End: 2020-05-29

## 2020-05-29 NOTE — TELEPHONE ENCOUNTER
Discussed with CR.       Future Appointments   Date Time Provider Vicente Estrada   6/1/2020 10:50 AM Anushka Lewis MD [facility]

## 2020-05-29 NOTE — TELEPHONE ENCOUNTER
Pt states she is having right foot surgery on 6/4 with DR. Joseph. Pt is having bone spur removal and adjustment of 2nd toe with pin placement. Pt states she will have Dr. Fiona Rich office send orders. Pt requesting appt for clearance.

## 2020-05-29 NOTE — TELEPHONE ENCOUNTER
patient is set for a pre op on 06-03 get in sooner with someone else? or earlier in day with nabor - please advise

## 2020-06-01 ENCOUNTER — OFFICE VISIT (OUTPATIENT)
Dept: FAMILY MEDICINE CLINIC | Facility: CLINIC | Age: 55
End: 2020-06-01
Payer: COMMERCIAL

## 2020-06-01 VITALS
RESPIRATION RATE: 16 BRPM | HEIGHT: 60 IN | WEIGHT: 138.63 LBS | SYSTOLIC BLOOD PRESSURE: 126 MMHG | DIASTOLIC BLOOD PRESSURE: 70 MMHG | TEMPERATURE: 98 F | OXYGEN SATURATION: 100 % | HEART RATE: 63 BPM | BODY MASS INDEX: 27.22 KG/M2

## 2020-06-01 DIAGNOSIS — D36.10 NEUROMA: ICD-10-CM

## 2020-06-01 DIAGNOSIS — M79.671 RIGHT FOOT PAIN: Primary | ICD-10-CM

## 2020-06-01 DIAGNOSIS — Z01.818 PREOPERATIVE CLEARANCE: ICD-10-CM

## 2020-06-01 PROBLEM — T50.905A DRUG-INDUCED OSTEOPENIA: Status: ACTIVE | Noted: 2020-05-21

## 2020-06-01 PROBLEM — M85.80 DRUG-INDUCED OSTEOPENIA: Status: ACTIVE | Noted: 2020-05-21

## 2020-06-01 PROBLEM — Z79.811 USE OF AROMATASE INHIBITORS: Status: ACTIVE | Noted: 2020-05-22

## 2020-06-01 PROCEDURE — 93000 ELECTROCARDIOGRAM COMPLETE: CPT | Performed by: FAMILY MEDICINE

## 2020-06-01 PROCEDURE — 99214 OFFICE O/P EST MOD 30 MIN: CPT | Performed by: FAMILY MEDICINE

## 2020-06-01 RX ORDER — TRAMADOL HYDROCHLORIDE 50 MG/1
TABLET ORAL NIGHTLY
COMMUNITY
Start: 2020-05-11 | End: 2020-11-25 | Stop reason: ALTCHOICE

## 2020-06-01 NOTE — PROGRESS NOTES
[facility]  PROGRESS NOTE        HPI:   This is a 62year old female coming in for Patient presents with:  Pre-Op Exam: Surgery date is Thursday. With Valentin Rueda.    Foot Pain: Right foot    HPI Patient intends to have a cyst removed f the score:      Age: 62 years      Sex: Female      Is Non- : No      Diabetic: No      Tobacco smoker: No      Systolic Blood Pressure: 009 mmHg      Is BP treated: No      HDL Cholesterol: 41 mg/dL      Total Cholesterol: 269 mg/d (EQL B COMPLEX) Oral Tab Take 1 tablet by mouth daily. • Albuterol Sulfate  (90 Base) MCG/ACT Inhalation Aero Soln Inhale 2 puffs into the lungs as needed. • cetirizine 10 MG Oral Tab Take 10 mg by mouth daily.        • fluticasone-salmeter Psychiatric/Behavioral: Negative. All other systems reviewed and are negative.       EXAM:   /70   Pulse 63   Temp 97.6 °F (36.4 °C) (Temporal)   Resp 16   Ht 60\"   Wt 138 lb 9.6 oz (62.9 kg)   SpO2 100%   BMI 27.07 kg/m²  Estimated body mass in clearance    Discussed risks and benefits of oeprative intervention. Including risk of Heart attack, Stroke and possible death. Patient a reasonable canididate for operative intervention.     ASAClass:  2  Airway Class: 1      No problem-specific Assessm

## 2020-06-04 RX ORDER — GEMFIBROZIL 600 MG/1
TABLET, FILM COATED ORAL
Qty: 180 TABLET | Refills: 1 | Status: SHIPPED | OUTPATIENT
Start: 2020-06-04 | End: 2020-11-25

## 2020-08-01 ENCOUNTER — OFFICE VISIT (OUTPATIENT)
Dept: FAMILY MEDICINE CLINIC | Facility: CLINIC | Age: 55
End: 2020-08-01
Payer: COMMERCIAL

## 2020-08-01 ENCOUNTER — LAB ENCOUNTER (OUTPATIENT)
Dept: LAB | Age: 55
End: 2020-08-01
Attending: FAMILY MEDICINE
Payer: COMMERCIAL

## 2020-08-01 VITALS
TEMPERATURE: 98 F | HEART RATE: 71 BPM | BODY MASS INDEX: 26.47 KG/M2 | HEIGHT: 60 IN | SYSTOLIC BLOOD PRESSURE: 120 MMHG | RESPIRATION RATE: 16 BRPM | DIASTOLIC BLOOD PRESSURE: 82 MMHG | OXYGEN SATURATION: 97 % | WEIGHT: 134.81 LBS

## 2020-08-01 DIAGNOSIS — Z01.818 PREOPERATIVE EXAMINATION: ICD-10-CM

## 2020-08-01 DIAGNOSIS — Z01.818 PREOPERATIVE EXAMINATION: Primary | ICD-10-CM

## 2020-08-01 DIAGNOSIS — Z98.82 S/P BILATERAL BREAST IMPLANTS: ICD-10-CM

## 2020-08-01 DIAGNOSIS — J45.20 MILD INTERMITTENT ASTHMA WITHOUT COMPLICATION: ICD-10-CM

## 2020-08-01 LAB
ALBUMIN SERPL-MCNC: 4.4 G/DL (ref 3.4–5)
ALBUMIN/GLOB SERPL: 1.3 {RATIO} (ref 1–2)
ALP LIVER SERPL-CCNC: 42 U/L (ref 46–118)
ALT SERPL-CCNC: 37 U/L (ref 13–56)
ANION GAP SERPL CALC-SCNC: 8 MMOL/L (ref 0–18)
AST SERPL-CCNC: 30 U/L (ref 15–37)
BASOPHILS # BLD AUTO: 0.06 X10(3) UL (ref 0–0.2)
BASOPHILS NFR BLD AUTO: 1.3 %
BILIRUB SERPL-MCNC: 0.9 MG/DL (ref 0.1–2)
BUN BLD-MCNC: 23 MG/DL (ref 7–18)
BUN/CREAT SERPL: 21.9 (ref 10–20)
CALCIUM BLD-MCNC: 10.5 MG/DL (ref 8.5–10.1)
CHLORIDE SERPL-SCNC: 104 MMOL/L (ref 98–112)
CO2 SERPL-SCNC: 27 MMOL/L (ref 21–32)
CREAT BLD-MCNC: 1.05 MG/DL (ref 0.55–1.02)
DEPRECATED RDW RBC AUTO: 40.2 FL (ref 35.1–46.3)
EOSINOPHIL # BLD AUTO: 0.18 X10(3) UL (ref 0–0.7)
EOSINOPHIL NFR BLD AUTO: 3.9 %
ERYTHROCYTE [DISTWIDTH] IN BLOOD BY AUTOMATED COUNT: 12 % (ref 11–15)
GLOBULIN PLAS-MCNC: 3.3 G/DL (ref 2.8–4.4)
GLUCOSE BLD-MCNC: 84 MG/DL (ref 70–99)
HCT VFR BLD AUTO: 42.4 % (ref 35–48)
HGB BLD-MCNC: 13.8 G/DL (ref 12–16)
IMM GRANULOCYTES # BLD AUTO: 0.02 X10(3) UL (ref 0–1)
IMM GRANULOCYTES NFR BLD: 0.4 %
LYMPHOCYTES # BLD AUTO: 1.25 X10(3) UL (ref 1–4)
LYMPHOCYTES NFR BLD AUTO: 27 %
M PROTEIN MFR SERPL ELPH: 7.7 G/DL (ref 6.4–8.2)
MCH RBC QN AUTO: 29.6 PG (ref 26–34)
MCHC RBC AUTO-ENTMCNC: 32.5 G/DL (ref 31–37)
MCV RBC AUTO: 91 FL (ref 80–100)
MONOCYTES # BLD AUTO: 0.35 X10(3) UL (ref 0.1–1)
MONOCYTES NFR BLD AUTO: 7.6 %
NEUTROPHILS # BLD AUTO: 2.77 X10 (3) UL (ref 1.5–7.7)
NEUTROPHILS # BLD AUTO: 2.77 X10(3) UL (ref 1.5–7.7)
NEUTROPHILS NFR BLD AUTO: 59.8 %
OSMOLALITY SERPL CALC.SUM OF ELEC: 291 MOSM/KG (ref 275–295)
PATIENT FASTING Y/N/NP: NO
PLATELET # BLD AUTO: 223 10(3)UL (ref 150–450)
POTASSIUM SERPL-SCNC: 3.8 MMOL/L (ref 3.5–5.1)
RBC # BLD AUTO: 4.66 X10(6)UL (ref 3.8–5.3)
SODIUM SERPL-SCNC: 139 MMOL/L (ref 136–145)
WBC # BLD AUTO: 4.6 X10(3) UL (ref 4–11)

## 2020-08-01 PROCEDURE — 3008F BODY MASS INDEX DOCD: CPT | Performed by: FAMILY MEDICINE

## 2020-08-01 PROCEDURE — 3074F SYST BP LT 130 MM HG: CPT | Performed by: FAMILY MEDICINE

## 2020-08-01 PROCEDURE — 93000 ELECTROCARDIOGRAM COMPLETE: CPT | Performed by: FAMILY MEDICINE

## 2020-08-01 PROCEDURE — 99214 OFFICE O/P EST MOD 30 MIN: CPT | Performed by: FAMILY MEDICINE

## 2020-08-01 PROCEDURE — 85025 COMPLETE CBC W/AUTO DIFF WBC: CPT | Performed by: FAMILY MEDICINE

## 2020-08-01 PROCEDURE — 3079F DIAST BP 80-89 MM HG: CPT | Performed by: FAMILY MEDICINE

## 2020-08-01 PROCEDURE — 80053 COMPREHEN METABOLIC PANEL: CPT | Performed by: FAMILY MEDICINE

## 2020-08-01 PROCEDURE — 36415 COLL VENOUS BLD VENIPUNCTURE: CPT | Performed by: FAMILY MEDICINE

## 2020-08-01 RX ORDER — OXYCODONE HYDROCHLORIDE AND ACETAMINOPHEN 5; 325 MG/1; MG/1
TABLET ORAL
COMMUNITY
Start: 2020-06-01 | End: 2020-11-25 | Stop reason: ALTCHOICE

## 2020-08-01 NOTE — PROGRESS NOTES
[facility]  PRE[facility]    Chief Complaint:   Patient presents with:  Pre-Op Exam: surgery 8/11      HPI:   Shruti Coronado is a 62year old female with a hx of breast cancer, breast implant and asthma, who presents for a pre-operative DAILY. GENERIC EQUIVALENT FOR LOPID 180 tablet 1   • traMADol HCl 50 MG Oral Tab Take  mg by mouth nightly. • atorvastatin 20 MG Oral Tab Take 1 tablet (20 mg total) by mouth nightly.  Due for labs 90 tablet 0   • OMEGA-3-ACID ETHYL ESTERS 1 g Chance Almeida itching, skin lesion, or excessive skin dryness. CARDIOVASCULAR:  Denies chest pain, chest pressure, chest discomfort, palpitations, edema, dyspnea on exertion or at rest.  RESPIRATORY:  Denies shortness of breath, wheezing, cough or sputum.   Jacque Rutledge no bruising, good turgor. HEART:  Regular rate and rhythm, no murmurs, rubs or gallops. LUNGS: Clear to auscultation bilterally, no rales/rhonchi/wheezing. CHEST: No tenderness.   ABDOMEN:  Soft, nondistended, nontender, bowel sounds normal in all 4 quad with any side effects or complications from the treatments as a result of today.      Problem List:  Patient Active Problem List:     Degeneration of lumbar or lumbosacral intervertebral disc     Malignant neoplasm of lower-inner quadrant of right breast of

## 2020-08-01 NOTE — PATIENT INSTRUCTIONS
EKG shows sinus bradycardia. After today's assessment  patient is at optimum health for surgery and relatively at low risk. There are no contraindication for procedure. Recommend to avoid any use of aleve, aspirin or ibuprofen 1 week before surgery.

## 2020-08-03 ENCOUNTER — TELEPHONE (OUTPATIENT)
Dept: FAMILY MEDICINE CLINIC | Facility: CLINIC | Age: 55
End: 2020-08-03

## 2020-08-03 DIAGNOSIS — E78.1 PURE HYPERTRIGLYCERIDEMIA: Primary | ICD-10-CM

## 2020-08-03 NOTE — TELEPHONE ENCOUNTER
Let pt know the following below. Pt verbalized her understanding and had no other questions at this time. Information faxed to surgeon.

## 2020-08-03 NOTE — TELEPHONE ENCOUNTER
----- Message from Lauri Hernández MD sent at 8/3/2020  8:43 AM CDT -----  Please inform patient that her labs are okay except slightly elevated calcium, recommend to cut back on calcium supplement if taking any. Patient may proceed with surgery.   Please fa

## 2020-08-03 NOTE — TELEPHONE ENCOUNTER
Patient is wondering if Dr Jadyn Ortiz could send over her lab orders to Dr Cristina Castaneda. Patient states that she always gets lipid/ Triglycerides done every 6 months which would be now but she is hoping to wait until November to have those done.      Dr Jadyn Ortiz ca

## 2020-08-04 NOTE — TELEPHONE ENCOUNTER
Faxed lab orders to Dr. Marina Esposito, fax# [phone]. Patient verbalized understanding and has no further questions or concerns.

## 2020-10-12 RX ORDER — ALBUTEROL SULFATE 90 UG/1
2 AEROSOL, METERED RESPIRATORY (INHALATION)
Qty: 3 INHALER | Refills: 0 | Status: SHIPPED | OUTPATIENT
Start: 2020-10-12

## 2020-10-12 RX ORDER — FLUTICASONE PROPIONATE AND SALMETEROL 100; 50 UG/1; UG/1
1 POWDER RESPIRATORY (INHALATION) DAILY
Qty: 3 EACH | Refills: 0 | Status: SHIPPED | OUTPATIENT
Start: 2020-10-12

## 2020-10-12 NOTE — TELEPHONE ENCOUNTER
Future appt:    Last Appointment with provider:   Visit date not found  Last appointment at [facility]:  8/1/2020  Last annual exam:  8/13/19  Hx: asthma    Pt contacted- due for annual exam.  Pt scheduled.   Pt states she will have lab draw on 11/13 with

## 2020-10-16 ENCOUNTER — TELEPHONE (OUTPATIENT)
Dept: FAMILY MEDICINE CLINIC | Facility: CLINIC | Age: 55
End: 2020-10-16

## 2020-10-16 NOTE — TELEPHONE ENCOUNTER
Spoke with pharmacist.  Questioning the directions on patient's Advair; 1 puff into the lungs daily. States normally this is 2 puffs into the lungs daily. Please advise. Phone number to call pharmacist back at is [phone].

## 2020-10-16 NOTE — TELEPHONE ENCOUNTER
Duran Franco out of the office. Appears this was an external refill from outside facility. Please confirm with the patient what dose she's been taking, historically it says one puff on the prior historical administration.

## 2020-10-16 NOTE — TELEPHONE ENCOUNTER
Spoke with patient. States the sticker with directions is not on her Advair anymore. States she has only been using 1 puff daily. States this seems to be working fine unless it is allergy season then occasionally she will use 2 puffs.   Patient states \"

## 2020-11-25 ENCOUNTER — OFFICE VISIT (OUTPATIENT)
Dept: FAMILY MEDICINE CLINIC | Facility: CLINIC | Age: 55
End: 2020-11-25
Payer: COMMERCIAL

## 2020-11-25 VITALS
SYSTOLIC BLOOD PRESSURE: 132 MMHG | HEART RATE: 78 BPM | RESPIRATION RATE: 16 BRPM | BODY MASS INDEX: 26.58 KG/M2 | DIASTOLIC BLOOD PRESSURE: 76 MMHG | OXYGEN SATURATION: 98 % | WEIGHT: 135.38 LBS | TEMPERATURE: 99 F | HEIGHT: 60 IN

## 2020-11-25 DIAGNOSIS — J45.20 MILD INTERMITTENT ASTHMA WITHOUT COMPLICATION: ICD-10-CM

## 2020-11-25 DIAGNOSIS — Z79.811 USE OF AROMATASE INHIBITORS: ICD-10-CM

## 2020-11-25 DIAGNOSIS — C50.311 MALIGNANT NEOPLASM OF LOWER-INNER QUADRANT OF RIGHT BREAST OF FEMALE, ESTROGEN RECEPTOR POSITIVE (HCC): ICD-10-CM

## 2020-11-25 DIAGNOSIS — Z17.0 MALIGNANT NEOPLASM OF LOWER-INNER QUADRANT OF RIGHT BREAST OF FEMALE, ESTROGEN RECEPTOR POSITIVE (HCC): ICD-10-CM

## 2020-11-25 DIAGNOSIS — E78.1 PURE HYPERTRIGLYCERIDEMIA: ICD-10-CM

## 2020-11-25 DIAGNOSIS — Z00.00 ANNUAL PHYSICAL EXAM: Primary | ICD-10-CM

## 2020-11-25 DIAGNOSIS — Z98.82 HX OF BREAST IMPLANTS, BILATERAL: ICD-10-CM

## 2020-11-25 DIAGNOSIS — T45.1X5A CHEMOTHERAPY-INDUCED NEUROPATHY (HCC): ICD-10-CM

## 2020-11-25 DIAGNOSIS — G62.0 CHEMOTHERAPY-INDUCED NEUROPATHY (HCC): ICD-10-CM

## 2020-11-25 PROCEDURE — 88175 CYTOPATH C/V AUTO FLUID REDO: CPT | Performed by: FAMILY MEDICINE

## 2020-11-25 PROCEDURE — 3075F SYST BP GE 130 - 139MM HG: CPT | Performed by: FAMILY MEDICINE

## 2020-11-25 PROCEDURE — 99072 ADDL SUPL MATRL&STAF TM PHE: CPT | Performed by: FAMILY MEDICINE

## 2020-11-25 PROCEDURE — 99396 PREV VISIT EST AGE 40-64: CPT | Performed by: FAMILY MEDICINE

## 2020-11-25 PROCEDURE — 3008F BODY MASS INDEX DOCD: CPT | Performed by: FAMILY MEDICINE

## 2020-11-25 PROCEDURE — 3078F DIAST BP <80 MM HG: CPT | Performed by: FAMILY MEDICINE

## 2020-11-25 PROCEDURE — 87624 HPV HI-RISK TYP POOLED RSLT: CPT | Performed by: FAMILY MEDICINE

## 2020-11-25 RX ORDER — DULOXETIN HYDROCHLORIDE 20 MG/1
1 CAPSULE, DELAYED RELEASE ORAL NIGHTLY
COMMUNITY
Start: 2020-11-23 | End: 2021-12-15

## 2020-11-25 RX ORDER — GEMFIBROZIL 600 MG/1
600 TABLET, FILM COATED ORAL 2 TIMES DAILY
Qty: 180 TABLET | Refills: 1 | Status: SHIPPED | OUTPATIENT
Start: 2020-11-25 | End: 2021-02-16

## 2020-11-25 NOTE — PATIENT INSTRUCTIONS
rec pap yearly while on cancer treatment    rec fasting labs in May    Continue present medications     Continue care with oncology    Patient to continue care with neurology and pain clinic.

## 2020-11-25 NOTE — PROGRESS NOTES
CC: Annual Physical Exam    HPI:   Edna Cintron is a 62year old female who presents for a complete physical exam.     Pt with breast reconstruction- replaced recalled breast implnts in August. Pt normal activity now    Pt also had \"pin \" in right 2 route daily. 3 Bottle 0   • cetirizine 10 MG Oral Tab Take 10 mg by mouth daily. • niacin 500 MG Oral Tab Take 500 mg by mouth daily. • anastrozole 1 MG Oral Tab tab Take 1 mg by mouth daily.        • Calcium Carb-Cholecalciferol (CALCIUM 1000 + D mariela. DPOHC: : Tylor Mccain      Exercise: walking. Diet: watches calories closely and low carb diet     REVIEW OF SYSTEMS:   CONSTITUTIONAL:  Denies unusual weight gain/loss, fever, chills, or fatigue.   EENT:  Eyes:  Denies eye pain, v Normocephalic, atraumatic   Eyes: EOMI, PERRLA, no scleral icterus, conjunctivae clear bilaterally, no eye discharge   Ears: TM's clear and intact bilaterally, no excess cerumen or erythema.    Nose: patent, no nasal discharge   Throat:  No tonsillar erythe aromatase inhibitors      7. Hx of breast implants, bilateral  Postoperative status stable      Order for mammogram and dexascan  Self Breast exam reviewed.   Health maintenance, will check: Orders Placed This Encounter      Hpv Dna  High Risk , Thin Prep C

## 2020-12-01 ENCOUNTER — TELEPHONE (OUTPATIENT)
Dept: FAMILY MEDICINE CLINIC | Facility: CLINIC | Age: 55
End: 2020-12-01

## 2021-02-16 RX ORDER — GEMFIBROZIL 600 MG/1
TABLET, FILM COATED ORAL
Qty: 180 TABLET | Refills: 3 | Status: SHIPPED | OUTPATIENT
Start: 2021-02-16 | End: 2021-05-17

## 2021-02-16 RX ORDER — OMEGA-3-ACID ETHYL ESTERS 1 G/1
CAPSULE, LIQUID FILLED ORAL
Qty: 360 CAPSULE | Refills: 3 | Status: SHIPPED | OUTPATIENT
Start: 2021-02-16 | End: 2021-11-01

## 2021-02-16 NOTE — TELEPHONE ENCOUNTER
Future appt:    Last Appointment with provider:   11/25/2020  Last appointment at [facility]:  11/25/2020- [address]  Fasting labs due in May      Omega 3 refilled on 10/7/19 for #360 with 3 refills      Cholesterol, Total (no units)   Date Value   11/19/2018 1

## 2021-02-16 NOTE — TELEPHONE ENCOUNTER
Future appt:    Last Appointment with provider:   11/25/2020  Last appointment at [facility]:  11/25/2020   [address]  Fasting labs due in May    Gemfibrozil refilled on 11/25/20 for #180 with one refill    Lipid panel last drawn: 11/18/20        Cholesterol, T

## 2021-03-30 ENCOUNTER — OFFICE VISIT (OUTPATIENT)
Dept: FAMILY MEDICINE CLINIC | Facility: CLINIC | Age: 56
End: 2021-03-30
Payer: COMMERCIAL

## 2021-03-30 ENCOUNTER — TELEPHONE (OUTPATIENT)
Dept: FAMILY MEDICINE CLINIC | Facility: CLINIC | Age: 56
End: 2021-03-30

## 2021-03-30 VITALS
BODY MASS INDEX: 25 KG/M2 | TEMPERATURE: 98 F | OXYGEN SATURATION: 96 % | SYSTOLIC BLOOD PRESSURE: 108 MMHG | DIASTOLIC BLOOD PRESSURE: 82 MMHG | WEIGHT: 130 LBS | HEART RATE: 86 BPM

## 2021-03-30 DIAGNOSIS — R11.2 INTRACTABLE VOMITING WITH NAUSEA, UNSPECIFIED VOMITING TYPE: Primary | ICD-10-CM

## 2021-03-30 DIAGNOSIS — R10.9 ABDOMINAL CRAMPING: ICD-10-CM

## 2021-03-30 PROCEDURE — 99214 OFFICE O/P EST MOD 30 MIN: CPT | Performed by: NURSE PRACTITIONER

## 2021-03-30 PROCEDURE — 3079F DIAST BP 80-89 MM HG: CPT | Performed by: NURSE PRACTITIONER

## 2021-03-30 PROCEDURE — 3074F SYST BP LT 130 MM HG: CPT | Performed by: NURSE PRACTITIONER

## 2021-03-30 RX ORDER — DICYCLOMINE HYDROCHLORIDE 10 MG/1
10 CAPSULE ORAL 4 TIMES DAILY PRN
Qty: 20 CAPSULE | Refills: 0 | Status: SHIPPED | OUTPATIENT
Start: 2021-03-30 | End: 2021-11-11

## 2021-03-30 NOTE — TELEPHONE ENCOUNTER
Pt called, c/o of abdominal cramping and bloating that started last night. Pt states she had 3 episodes of vomiting this morning. Pt states she tried to take Dicyclomine- states that is not helping. Pt had no change in diet.   No diarrhea, no fever repor

## 2021-03-30 NOTE — TELEPHONE ENCOUNTER
Discussed with CR, pt can be seen in sick clinic this afternoon. Pt appt rescheduled.     Future Appointments   Date Time Provider Vicente Estrada   3/30/2021  3:15 PM Joy Menendez APRN [facility]

## 2021-03-30 NOTE — TELEPHONE ENCOUNTER
Patient states that she is experiencing severe cramping, states that it's not really abdominal pain and since she had her Covid vaccine last Thursday pt is wondering if her cramping is due to side effects from the vaccine.  Pt answered no to all TS question

## 2021-03-30 NOTE — PROGRESS NOTES
HPI:    Patient ID: Selena Heard is a 60year old female. HPI     Present for complaints of abdominal cramping to the respiratory clinic. Has been eating more jelly beans. Occurred after yoga.    Had her Covid vaccine Socorro De La Cruz) #1 on Thursday (3/25) 300 mg by mouth 3 (three) times daily. • Multiple Vitamins-Minerals (CENTRUM SILVER) Oral Tab Take 1 tablet by mouth daily. Allergies:   Other                   HIVES    Comment:protein bars   PHYSICAL EXAM:      03/30/21  1529   BP: 108/82 diet when ready for foods.            BT#[id number]

## 2021-03-30 NOTE — PATIENT INSTRUCTIONS
Covid testing pending. If normal, get Lipids and CMP. If pain persists, abdominal ultrasound. If unable to keep liquids down in the next 24 hours, go to ER for fluids. Take Zofran as needed for the nausea. Clear liquid diet.   Avoid dair

## 2021-03-31 ENCOUNTER — LABORATORY ENCOUNTER (OUTPATIENT)
Dept: LAB | Age: 56
End: 2021-03-31
Attending: FAMILY MEDICINE
Payer: COMMERCIAL

## 2021-03-31 ENCOUNTER — TELEPHONE (OUTPATIENT)
Dept: FAMILY MEDICINE CLINIC | Facility: CLINIC | Age: 56
End: 2021-03-31

## 2021-03-31 DIAGNOSIS — E78.1 PURE HYPERTRIGLYCERIDEMIA: ICD-10-CM

## 2021-03-31 DIAGNOSIS — R73.09 ELEVATED GLUCOSE: ICD-10-CM

## 2021-03-31 LAB — SARS-COV-2 RNA RESP QL NAA+PROBE: NOT DETECTED

## 2021-03-31 PROCEDURE — 80061 LIPID PANEL: CPT | Performed by: FAMILY MEDICINE

## 2021-03-31 PROCEDURE — 80050 GENERAL HEALTH PANEL: CPT | Performed by: FAMILY MEDICINE

## 2021-03-31 PROCEDURE — 87086 URINE CULTURE/COLONY COUNT: CPT | Performed by: FAMILY MEDICINE

## 2021-03-31 PROCEDURE — 83036 HEMOGLOBIN GLYCOSYLATED A1C: CPT | Performed by: FAMILY MEDICINE

## 2021-03-31 PROCEDURE — 36415 COLL VENOUS BLD VENIPUNCTURE: CPT | Performed by: FAMILY MEDICINE

## 2021-03-31 PROCEDURE — 81001 URINALYSIS AUTO W/SCOPE: CPT | Performed by: FAMILY MEDICINE

## 2021-03-31 NOTE — TELEPHONE ENCOUNTER
Has lab appt 3/31/21. Answered yes to negative covid test on travel screening. Pt saw Mickey Logan 3/30/21.

## 2021-03-31 NOTE — TELEPHONE ENCOUNTER
----- Message from ESPERANZA Stearns sent at 3/31/2021  9:34 AM CDT -----  Please let patient know the Covid test is negative. Thank you. Note to Darrint.

## 2021-04-01 ENCOUNTER — TELEPHONE (OUTPATIENT)
Dept: FAMILY MEDICINE CLINIC | Facility: CLINIC | Age: 56
End: 2021-04-01

## 2021-04-01 DIAGNOSIS — R10.9 ABDOMINAL CRAMPING: Primary | ICD-10-CM

## 2021-04-01 NOTE — TELEPHONE ENCOUNTER
Patient was last seen in the office 3/30/21 for abdominal pain. Patient states she was only on clear liquid diet for 1 day and is on a regular diet now. She is having abdominal pain still , especially after eating.  She is taking dicyclomine 2-3 times a day

## 2021-04-01 NOTE — TELEPHONE ENCOUNTER
pt wants to go over test results and wants to let tommy know that her stomach is still giving her problems

## 2021-04-01 NOTE — TELEPHONE ENCOUNTER
Abdominal ultrasound entered. Please fax to [facility]. Yes symptoms could be secondary to the vaccine. Patient also talked about at the appointment wanting to have her lipids checked. Please verify if she wants this and I will put an order in for her.  Than

## 2021-04-05 ENCOUNTER — TELEPHONE (OUTPATIENT)
Dept: FAMILY MEDICINE CLINIC | Facility: CLINIC | Age: 56
End: 2021-04-05

## 2021-04-05 NOTE — TELEPHONE ENCOUNTER
Please let patient know that the abdominal ultrasound is basically normal. If continuing to have symptoms, recommend follow up with PCP or referral to GI.

## 2021-05-17 ENCOUNTER — HOSPITAL ENCOUNTER (OUTPATIENT)
Dept: GENERAL RADIOLOGY | Age: 56
Discharge: HOME | End: 2021-05-17
Attending: FAMILY MEDICINE
Payer: COMMERCIAL

## 2021-05-17 ENCOUNTER — LAB ENCOUNTER (OUTPATIENT)
Dept: LAB | Age: 56
End: 2021-05-17
Attending: FAMILY MEDICINE
Payer: COMMERCIAL

## 2021-05-17 ENCOUNTER — OFFICE VISIT (OUTPATIENT)
Dept: FAMILY MEDICINE CLINIC | Facility: CLINIC | Age: 56
End: 2021-05-17
Payer: COMMERCIAL

## 2021-05-17 VITALS
BODY MASS INDEX: 25.3 KG/M2 | HEIGHT: 61 IN | OXYGEN SATURATION: 97 % | TEMPERATURE: 97 F | HEART RATE: 70 BPM | DIASTOLIC BLOOD PRESSURE: 82 MMHG | RESPIRATION RATE: 18 BRPM | SYSTOLIC BLOOD PRESSURE: 112 MMHG | WEIGHT: 134 LBS

## 2021-05-17 DIAGNOSIS — J45.20 MILD INTERMITTENT ASTHMA WITHOUT COMPLICATION: ICD-10-CM

## 2021-05-17 DIAGNOSIS — Z01.818 PREOP EXAMINATION: Primary | ICD-10-CM

## 2021-05-17 DIAGNOSIS — E78.1 PURE HYPERTRIGLYCERIDEMIA: ICD-10-CM

## 2021-05-17 DIAGNOSIS — M17.12 PRIMARY OSTEOARTHRITIS OF LEFT KNEE: ICD-10-CM

## 2021-05-17 DIAGNOSIS — Z01.818 PREOP EXAMINATION: ICD-10-CM

## 2021-05-17 PROCEDURE — 3079F DIAST BP 80-89 MM HG: CPT | Performed by: FAMILY MEDICINE

## 2021-05-17 PROCEDURE — 93000 ELECTROCARDIOGRAM COMPLETE: CPT | Performed by: FAMILY MEDICINE

## 2021-05-17 PROCEDURE — 71046 X-RAY EXAM CHEST 2 VIEWS: CPT | Performed by: FAMILY MEDICINE

## 2021-05-17 PROCEDURE — 85025 COMPLETE CBC W/AUTO DIFF WBC: CPT | Performed by: FAMILY MEDICINE

## 2021-05-17 PROCEDURE — 3008F BODY MASS INDEX DOCD: CPT | Performed by: FAMILY MEDICINE

## 2021-05-17 PROCEDURE — 80053 COMPREHEN METABOLIC PANEL: CPT | Performed by: FAMILY MEDICINE

## 2021-05-17 PROCEDURE — 81003 URINALYSIS AUTO W/O SCOPE: CPT | Performed by: FAMILY MEDICINE

## 2021-05-17 PROCEDURE — 99215 OFFICE O/P EST HI 40 MIN: CPT | Performed by: FAMILY MEDICINE

## 2021-05-17 PROCEDURE — 3074F SYST BP LT 130 MM HG: CPT | Performed by: FAMILY MEDICINE

## 2021-05-17 RX ORDER — FENOFIBRATE 145 MG/1
145 TABLET, COATED ORAL DAILY
Qty: 90 TABLET | Refills: 1 | Status: SHIPPED | OUTPATIENT
Start: 2021-05-17 | End: 2021-11-01

## 2021-05-17 NOTE — PATIENT INSTRUCTIONS
See note   Preoperative laboratory testing EKG, chest x-ray, vitals reviewed all found to be stable. Patient medically stable to proceed with left knee arthroplasty with Dr. Fabian Ortiz.       Patient changed from Lopid to TriCor and treatment of elevated

## 2021-05-17 NOTE — PROGRESS NOTES
[facility] [place]  PROGRESS NOTE  Chief Complaint:   Patient presents with:  Pre-Op Exam: Left total knee arthroplasty on 6/10/21 by Deisy Xavier MD at [facility] [place]      HPI:   This is a 60year old female coming in for preoperative Absolute Prelim 1.96 1.50 - 7.70 x10 (3) uL    Neutrophil Absolute 1.96 1.50 - 7.70 x10(3) uL    Lymphocyte Absolute 1.67 1.00 - 4.00 x10(3) uL    Monocyte Absolute 0.34 0.10 - 1.00 x10(3) uL    Eosinophil Absolute 0.28 0.00 - 0.70 x10(3) uL    Basophil Ab fat, low sugar        Stress Concern: No        Weight Concern: No    Family History:  Family History   Problem Relation Age of Onset   • Cancer Father 68        bladder cancer   • Diabetes Father    • Hypertension Mother      Allergies:     Other throat. INTEGUMENTARY:  Denies rashes, itching, skin lesion, or excessive skin dryness.   CARDIOVASCULAR:  Denies chest pain, chest pressure, chest discomfort, palpitations, edema, dyspnea on exertion or at rest.  RESPIRATORY:  Denies shortness of breath, gallops. LUNGS: Clear to auscultation bilterally, no rales/rhonchi/wheezing. CHEST: No tenderness. ABDOMEN:  Soft, nondistended, nontender, bowel sounds normal, no masses, no hepatosplenomegaly. BACK: No tenderness, no spasm, SLR test negative, FROM. all found to be stable. Patient medically stable to proceed with left knee arthroplasty with Dr. Annie Wilkerson.       Problem List:  Patient Active Problem List:     Degeneration of lumbar or lumbosacral intervertebral disc     Malignant neoplasm of lower-in

## 2021-05-18 ENCOUNTER — TELEPHONE (OUTPATIENT)
Dept: FAMILY MEDICINE CLINIC | Facility: CLINIC | Age: 56
End: 2021-05-18

## 2021-05-18 NOTE — TELEPHONE ENCOUNTER
----- Message from Akhil Jones MD sent at 5/17/2021  6:35 PM CDT -----  CBC and chemistry found to be stable. Laboratories stable for proposed surgery. Results forwarded to patient via [employer] system.   Patient encouraged to call for any question

## 2021-05-18 NOTE — TELEPHONE ENCOUNTER
H&P, EKG, lab, and CXR dated 5/17/21 faxed to Dr. Ismael Castillo at [facility]. Pt informed.

## 2021-05-25 ENCOUNTER — TELEPHONE (OUTPATIENT)
Dept: FAMILY MEDICINE CLINIC | Facility: CLINIC | Age: 56
End: 2021-05-25

## 2021-05-25 NOTE — TELEPHONE ENCOUNTER
is going to have bloodwork drawn in november per dr Fabian Broderick, want to add anything to this bloodwork? lipid?  to be done at the same time

## 2021-05-26 NOTE — TELEPHONE ENCOUNTER
Spoke with pt and informed her of expected date of labs. Pt states that she will be having blood drawn in November with Darcie in order to get back on track with having blood drawn every 6 months. Sending orders to [address].

## 2021-08-09 ENCOUNTER — TELEPHONE (OUTPATIENT)
Dept: FAMILY MEDICINE CLINIC | Facility: CLINIC | Age: 56
End: 2021-08-09

## 2021-08-09 RX ORDER — GABAPENTIN 300 MG/1
600 CAPSULE ORAL 3 TIMES DAILY
Qty: 180 CAPSULE | Refills: 0 | Status: SHIPPED | OUTPATIENT
Start: 2021-08-09 | End: 2021-11-01

## 2021-08-09 NOTE — TELEPHONE ENCOUNTER
Left message for pt. Need to clarify which medications are being requested for refill. Last px: 11/25/20.- pt was advised to return in 6-12 months.
Pt returned call,  Attempted to reach pt. Left message for pt Taniya Valle.
Re:  would like all her Rx's to go through Dr. Nicola Shine  - but does need a refill gabapentin  - Call into [facility]
Spoke with pt. Pt states she is completely out of her Gabapentin. Pt states she initially began treatment with Ya Bentley. Pt states she takes Gabapentin for neuropathy that she has in her hands and feet- pt sates that is as a a result form her chemo.
yes

## 2021-08-14 ENCOUNTER — OFFICE VISIT (OUTPATIENT)
Dept: FAMILY MEDICINE CLINIC | Facility: CLINIC | Age: 56
End: 2021-08-14
Payer: COMMERCIAL

## 2021-08-14 VITALS
WEIGHT: 128.63 LBS | TEMPERATURE: 98 F | OXYGEN SATURATION: 98 % | BODY MASS INDEX: 24.29 KG/M2 | HEART RATE: 84 BPM | DIASTOLIC BLOOD PRESSURE: 84 MMHG | HEIGHT: 61 IN | SYSTOLIC BLOOD PRESSURE: 122 MMHG | RESPIRATION RATE: 16 BRPM

## 2021-08-14 DIAGNOSIS — N30.90 CYSTITIS: ICD-10-CM

## 2021-08-14 DIAGNOSIS — R35.0 URINARY FREQUENCY: ICD-10-CM

## 2021-08-14 DIAGNOSIS — R30.0 DYSURIA: Primary | ICD-10-CM

## 2021-08-14 LAB
APPEARANCE: CLEAR
BILIRUB UR QL STRIP.AUTO: NEGATIVE
BILIRUBIN: NEGATIVE
CLARITY UR REFRACT.AUTO: CLEAR
GLUCOSE (URINE DIPSTICK): NEGATIVE MG/DL
GLUCOSE UR STRIP.AUTO-MCNC: NEGATIVE MG/DL
KETONES (URINE DIPSTICK): NEGATIVE MG/DL
KETONES UR STRIP.AUTO-MCNC: NEGATIVE MG/DL
MULTISTIX LOT#: 5077 NUMERIC
NITRITE UR QL STRIP.AUTO: POSITIVE
NITRITE, URINE: POSITIVE
OCCULT BLOOD: NEGATIVE
PH UR STRIP.AUTO: 7 [PH] (ref 5–8)
PH, URINE: 7 (ref 4.5–8)
PROT UR STRIP.AUTO-MCNC: NEGATIVE MG/DL
PROTEIN (URINE DIPSTICK): NEGATIVE MG/DL
RBC UR QL AUTO: NEGATIVE
SP GR UR STRIP.AUTO: 1.01 (ref 1–1.03)
SPECIFIC GRAVITY: 1.02 (ref 1–1.03)
URINE-COLOR: YELLOW
UROBILINOGEN UR STRIP.AUTO-MCNC: <2 MG/DL
UROBILINOGEN,SEMI-QN: 0.2 MG/DL (ref 0–1.9)

## 2021-08-14 PROCEDURE — 3074F SYST BP LT 130 MM HG: CPT | Performed by: NURSE PRACTITIONER

## 2021-08-14 PROCEDURE — 87088 URINE BACTERIA CULTURE: CPT | Performed by: NURSE PRACTITIONER

## 2021-08-14 PROCEDURE — 81001 URINALYSIS AUTO W/SCOPE: CPT | Performed by: NURSE PRACTITIONER

## 2021-08-14 PROCEDURE — 3008F BODY MASS INDEX DOCD: CPT | Performed by: NURSE PRACTITIONER

## 2021-08-14 PROCEDURE — 81003 URINALYSIS AUTO W/O SCOPE: CPT | Performed by: NURSE PRACTITIONER

## 2021-08-14 PROCEDURE — 99213 OFFICE O/P EST LOW 20 MIN: CPT | Performed by: NURSE PRACTITIONER

## 2021-08-14 PROCEDURE — 87186 SC STD MICRODIL/AGAR DIL: CPT | Performed by: NURSE PRACTITIONER

## 2021-08-14 PROCEDURE — 87086 URINE CULTURE/COLONY COUNT: CPT | Performed by: NURSE PRACTITIONER

## 2021-08-14 PROCEDURE — 3079F DIAST BP 80-89 MM HG: CPT | Performed by: NURSE PRACTITIONER

## 2021-08-14 RX ORDER — NITROFURANTOIN 25; 75 MG/1; MG/1
100 CAPSULE ORAL 2 TIMES DAILY
Qty: 20 CAPSULE | Refills: 0 | Status: SHIPPED | OUTPATIENT
Start: 2021-08-14 | End: 2021-08-24

## 2021-08-14 NOTE — PROGRESS NOTES
HPI/Subjective:   Patient ID: Renell Schaumann is a 60year old female. Patient presents to the clinic today for evaluation of UTI-like symptoms. Reports symptoms began approximately 1 day ago.   Patient status post left knee replacement about 6 weeks puffs into the lungs every 4 to 6 hours as needed. 3 Inhaler 0   • fluticasone-salmeterol 100-50 MCG/DOSE Inhalation Aerosol Powder, Breath Activated Inhale 1 puff into the lungs daily.  3 each 0   • Fluticasone Propionate 50 MCG/ACT Nasal Suspension 2 spra agreeable to the plan verbalized understanding.     Meds This Visit:  Requested Prescriptions     Signed Prescriptions Disp Refills   • nitrofurantoin monohydrate macro 100 MG Oral Cap 20 capsule 0     Sig: Take 1 capsule (100 mg total) by mouth 2 (two) rakel

## 2021-08-14 NOTE — PATIENT INSTRUCTIONS
Begin taking antibiotic as prescribed unless we call you until you otherwise once the urine culture comes back  May take Azo for 1 more day. Continue lots of hydration, at least 64 ounces of water daily.   No submerging in water, pool, bath hot tub etc.  N

## 2021-08-16 ENCOUNTER — TELEPHONE (OUTPATIENT)
Dept: FAMILY MEDICINE CLINIC | Facility: CLINIC | Age: 56
End: 2021-08-16

## 2021-08-16 NOTE — TELEPHONE ENCOUNTER
----- Message from ESPERANZA Lr sent at 8/16/2021 12:48 PM CDT -----  [employer] message sent  I have reviewed your results. Urine culture showing E. coli bacteria in your urine.   Macrobid antibiotic that you are currently on is sensitive to his medi

## 2021-11-01 ENCOUNTER — TELEPHONE (OUTPATIENT)
Dept: FAMILY MEDICINE CLINIC | Facility: CLINIC | Age: 56
End: 2021-11-01

## 2021-11-01 RX ORDER — GABAPENTIN 300 MG/1
600 CAPSULE ORAL 3 TIMES DAILY
Qty: 540 CAPSULE | Refills: 0 | Status: SHIPPED | OUTPATIENT
Start: 2021-11-01

## 2021-11-01 RX ORDER — FENOFIBRATE 145 MG/1
145 TABLET, COATED ORAL DAILY
Qty: 90 TABLET | Refills: 0 | Status: SHIPPED | OUTPATIENT
Start: 2021-11-01 | End: 2021-12-02

## 2021-11-01 RX ORDER — OMEGA-3-ACID ETHYL ESTERS 1 G/1
2 CAPSULE, LIQUID FILLED ORAL 2 TIMES DAILY
Qty: 360 CAPSULE | Refills: 0 | Status: SHIPPED | OUTPATIENT
Start: 2021-11-01

## 2021-11-01 NOTE — TELEPHONE ENCOUNTER
Future appt:    Last Appointment with provider:   5/17/2021  Last appointment at [facility]:  8/14/2021    Last px: 11/25/20      Pt called for refills.   RX's reviewed with pt  Pt scheduled for px with CR in Dec  Pt changed mail order pharmacy- pt now

## 2021-11-11 ENCOUNTER — TELEPHONE (OUTPATIENT)
Dept: FAMILY MEDICINE CLINIC | Facility: CLINIC | Age: 56
End: 2021-11-11

## 2021-11-11 RX ORDER — DICYCLOMINE HYDROCHLORIDE 10 MG/1
10 CAPSULE ORAL 4 TIMES DAILY PRN
Qty: 20 CAPSULE | Refills: 0 | Status: SHIPPED | OUTPATIENT
Start: 2021-11-11

## 2021-11-11 NOTE — TELEPHONE ENCOUNTER
Future appt:     Your appointments     Date & Time Appointment Department [facility])    Dec 15, 2021  1:30 PM CST Physical - Established with Harshad Lozano MD [facility], [address]            THE [facility]

## 2021-12-02 ENCOUNTER — TELEPHONE (OUTPATIENT)
Dept: FAMILY MEDICINE CLINIC | Facility: CLINIC | Age: 56
End: 2021-12-02

## 2021-12-02 NOTE — TELEPHONE ENCOUNTER
Patient informed of the below recommendations. Patient asking if she can have a prescription for Gemfibrozil. States she cannot be off Cholesterol medication because her Triglycerides will go too high and she will end up in the hospital.  OTW.   Please ad

## 2021-12-02 NOTE — TELEPHONE ENCOUNTER
Lab results from [facility] done 11/23/21 reviewed. Patient should STOP fenifibrate, niacin, tylenol. Patient should schedule appointment for f.u in a month. Pt should do fasting lipid and chem panel in a month- just before appointment.

## 2021-12-02 NOTE — TELEPHONE ENCOUNTER
RE:   Liver test results    were ordered by Oncologist and they will be sending over.      Scan results were bad    She needs to change meds ASAP     needs to D/C  Fenobrate and resume Gimsilverfal             Future Appointments   Date Time Provider Depart

## 2021-12-02 NOTE — TELEPHONE ENCOUNTER
Please let patient know Dr. John Kay will be back in the office tomorrow she can hold her medication for today

## 2021-12-03 NOTE — TELEPHONE ENCOUNTER
Pt informed of recommendations. Pt has appointments scheduled. No further questions at this time.     Future Appointments   Date Time Provider Vicente Estrada   12/6/2021  8:15 AM REF [place] [facility]   12/15/2021  1:30 PM Brisa Flower,

## 2021-12-06 ENCOUNTER — LABORATORY ENCOUNTER (OUTPATIENT)
Dept: LAB | Age: 56
End: 2021-12-06
Attending: FAMILY MEDICINE
Payer: COMMERCIAL

## 2021-12-06 DIAGNOSIS — E78.1 PURE HYPERTRIGLYCERIDEMIA: ICD-10-CM

## 2021-12-06 PROCEDURE — 83721 ASSAY OF BLOOD LIPOPROTEIN: CPT | Performed by: FAMILY MEDICINE

## 2021-12-06 PROCEDURE — 80053 COMPREHEN METABOLIC PANEL: CPT | Performed by: FAMILY MEDICINE

## 2021-12-07 ENCOUNTER — TELEPHONE (OUTPATIENT)
Dept: FAMILY MEDICINE CLINIC | Facility: CLINIC | Age: 56
End: 2021-12-07

## 2021-12-07 DIAGNOSIS — E78.1 PURE HYPERTRIGLYCERIDEMIA: Primary | ICD-10-CM

## 2021-12-07 NOTE — TELEPHONE ENCOUNTER
She stated she had blood drawn yesterday and she supposed to get a lipid done every six months because of hx of hyperlipidemia.

## 2021-12-07 NOTE — TELEPHONE ENCOUNTER
Per dr Toño Ceron will not prescribe cholesterol meds until liver enzymes are lowered.     mychart sent

## 2021-12-07 NOTE — TELEPHONE ENCOUNTER
Future Appointments   Date Time Provider Vicente Natalie   12/15/2021  1:30 PM Talia Kahn MD [facility] [place] [facility] [address]       Patient had called twice before regarding having her Triglycerides tested.    Order was to be faxed to cancer center, but

## 2021-12-07 NOTE — TELEPHONE ENCOUNTER
Ok for lipid panel in chart. Pt may want to wait til appointment to determine if other labs needed.   Otherwise of for lipid test to be done    Future Appointments   Date Time Provider Vicente Estrada   12/15/2021  1:30 PM Linda Bush MD [facility]

## 2021-12-08 NOTE — TELEPHONE ENCOUNTER
Pt has reviewed message on Triglycerides via [employer].     Last Read in [employer]   12/7/2021 10:03 PM by Roscoe Boone

## 2021-12-14 ENCOUNTER — TELEPHONE (OUTPATIENT)
Dept: FAMILY MEDICINE CLINIC | Facility: CLINIC | Age: 56
End: 2021-12-14

## 2021-12-14 NOTE — TELEPHONE ENCOUNTER
waiting on results, she needs them to see Dr. Jeremy Wong.   Please let her know  Future Appointments   Date Time Provider Vicente Estrada   12/15/2021  1:30 PM Katina Temple MD [facility]

## 2021-12-14 NOTE — TELEPHONE ENCOUNTER
Pt should keep appointment to discuss elevated liver enzymes.  It is ok if lipids not back by time of appointment

## 2021-12-15 ENCOUNTER — PATIENT MESSAGE (OUTPATIENT)
Dept: FAMILY MEDICINE CLINIC | Facility: CLINIC | Age: 56
End: 2021-12-15

## 2021-12-15 ENCOUNTER — OFFICE VISIT (OUTPATIENT)
Dept: FAMILY MEDICINE CLINIC | Facility: CLINIC | Age: 56
End: 2021-12-15
Payer: COMMERCIAL

## 2021-12-15 VITALS
HEART RATE: 72 BPM | WEIGHT: 134.63 LBS | SYSTOLIC BLOOD PRESSURE: 126 MMHG | BODY MASS INDEX: 25.42 KG/M2 | RESPIRATION RATE: 20 BRPM | DIASTOLIC BLOOD PRESSURE: 82 MMHG | TEMPERATURE: 98 F | HEIGHT: 61 IN

## 2021-12-15 DIAGNOSIS — Z85.3 HISTORY OF BREAST CANCER: ICD-10-CM

## 2021-12-15 DIAGNOSIS — M15.9 PRIMARY OSTEOARTHRITIS INVOLVING MULTIPLE JOINTS: ICD-10-CM

## 2021-12-15 DIAGNOSIS — Z00.00 ANNUAL PHYSICAL EXAM: Primary | ICD-10-CM

## 2021-12-15 DIAGNOSIS — J45.20 MILD INTERMITTENT ASTHMA WITHOUT COMPLICATION: ICD-10-CM

## 2021-12-15 DIAGNOSIS — E78.1 PURE HYPERTRIGLYCERIDEMIA: ICD-10-CM

## 2021-12-15 DIAGNOSIS — R73.09 ELEVATED GLUCOSE: ICD-10-CM

## 2021-12-15 DIAGNOSIS — R79.89 ELEVATED LIVER FUNCTION TESTS: ICD-10-CM

## 2021-12-15 PROBLEM — Z79.811 USE OF AROMATASE INHIBITORS: Status: RESOLVED | Noted: 2020-05-22 | Resolved: 2021-12-15

## 2021-12-15 PROBLEM — M25.531 RIGHT WRIST PAIN: Status: RESOLVED | Noted: 2019-01-11 | Resolved: 2021-12-15

## 2021-12-15 PROCEDURE — 3079F DIAST BP 80-89 MM HG: CPT | Performed by: FAMILY MEDICINE

## 2021-12-15 PROCEDURE — 99396 PREV VISIT EST AGE 40-64: CPT | Performed by: FAMILY MEDICINE

## 2021-12-15 PROCEDURE — 3008F BODY MASS INDEX DOCD: CPT | Performed by: FAMILY MEDICINE

## 2021-12-15 PROCEDURE — 99213 OFFICE O/P EST LOW 20 MIN: CPT | Performed by: FAMILY MEDICINE

## 2021-12-15 PROCEDURE — 3074F SYST BP LT 130 MM HG: CPT | Performed by: FAMILY MEDICINE

## 2021-12-15 NOTE — PROGRESS NOTES
CC: Annual Physical Exam    HPI:   Eamon Mckeon is a 60year old female who presents for a complete physical exam.  Patient complains of elevated liver function. [facility] pT SEEING dR. HAMMOND-- LABS PER ONCOLOGY    Pt increase liver function.  Pt on med f Medication Sig Dispense Refill   • gabapentin 300 MG Oral Cap Take 2 capsules (600 mg total) by mouth 3 (three) times daily. 540 capsule 0   • Omega-3-acid Ethyl Esters 1 g Oral Cap Take 2 capsules (2 g total) by mouth 2 (two) times daily.  360 capsule 0 homemaker    Tobacco Use      Smoking status: Never Smoker      Smokeless tobacco: Never Used    Vaping Use      Vaping Use: Never used    Substance and Sexual Activity      Alcohol use: Not Currently        Alcohol/week: 0.0 standard drinks      Drug use: (36.8 °C) (Tympanic)   Resp 20   Ht 5' 1\" (1.549 m)   Wt 134 lb 9.6 oz (61.1 kg)   BMI 25.43 kg/m²  Estimated body mass index is 25.43 kg/m² as calculated from the following:    Height as of this encounter: 5' 1\" (1.549 m).     Weight as of this encounter reviewed    2. Elevated liver function tests  Increased liver function based on hypertriglycerides treatment. Treatment stopped allow time for liver healing and recheck in 6 weeks.   No treatment of hypertriglyceridemia pending improvement of liver functio studies.

## 2021-12-15 NOTE — PATIENT INSTRUCTIONS
rec hold cholesterol medication    rec recheck liver function 6 mattie-- fasting and check hgba1c then to check glucose as well      Continue activity and exercise    Continue medications

## 2021-12-15 NOTE — TELEPHONE ENCOUNTER
From: Dev Petty  To: Jitendra Paul MD  Sent: 12/15/2021 2:14 PM CST  Subject: Flu shot date add to record please     I received my flu shot on September 26, 2021 at [address] [place] [address] Avenue Please add to my record. Thank you!

## 2021-12-27 ENCOUNTER — TELEPHONE (OUTPATIENT)
Dept: FAMILY MEDICINE CLINIC | Facility: CLINIC | Age: 56
End: 2021-12-27

## 2021-12-27 NOTE — TELEPHONE ENCOUNTER
Pt wants to know if an order can be out in just for a covid test since she was recently seen. She doesn't think she needs to do another visit for the order.

## 2021-12-27 NOTE — TELEPHONE ENCOUNTER
Pt has sore throat and arpan thinks that she was exposed around the 18th States that she would like to be tested at [facility] because they changed the way the [facility] in\" clinic works.      She is going to call back in the morning for a virtual visit

## 2021-12-27 NOTE — TELEPHONE ENCOUNTER
Please let patient know there are multiple free testing sites available–she can google [facility] test near me to find the site closest to her–typically these are walk-in without any orders needed.   If she was exposed and is not having symptoms she should wait

## 2021-12-27 NOTE — TELEPHONE ENCOUNTER
wants order for coivd sent to [facility], was exposed and is now feeling sick now

## 2021-12-28 ENCOUNTER — TELEMEDICINE (OUTPATIENT)
Dept: FAMILY MEDICINE CLINIC | Facility: CLINIC | Age: 56
End: 2021-12-28
Payer: COMMERCIAL

## 2021-12-28 VITALS — TEMPERATURE: 97 F | WEIGHT: 130 LBS | HEIGHT: 61 IN | BODY MASS INDEX: 24.55 KG/M2

## 2021-12-28 DIAGNOSIS — J06.9 VIRAL UPPER RESPIRATORY TRACT INFECTION: Primary | ICD-10-CM

## 2021-12-28 PROCEDURE — 3008F BODY MASS INDEX DOCD: CPT | Performed by: NURSE PRACTITIONER

## 2021-12-28 PROCEDURE — 99213 OFFICE O/P EST LOW 20 MIN: CPT | Performed by: NURSE PRACTITIONER

## 2021-12-28 NOTE — PATIENT INSTRUCTIONS
Albuterol inhaler prior to advair-  Use advir twice a day - rinse mouth     Albuterol inhaler every 4-6 hrs as needed     covid test at [facility].      Sleep on your stomach      Use incentive spirometer -     Rest, increase fluids, salt water gargles ,neti po

## 2021-12-28 NOTE — PROGRESS NOTES
CHIEF COMPLAINT:   Patient presents with:  Upper Respiratory Infection      HPI:   Erica Manuel is a 60year old female who presents to clinic today via video visit  with complaints of exposed on 12/18   Started not feeling well on 12/22 - has not h 5/22/2020    Stopped after 5 year  Therapy  --2021      Social History:  Social History    Tobacco Use      Smoking status: Never Smoker      Smokeless tobacco: Never Used    Vaping Use      Vaping Use: Never used    Alcohol use: Not Currently      Alcohol plan.  Follow up if symptoms do not improve or if symptoms worsen.     Meds & Refills for this Visit:  Requested Prescriptions      No prescriptions requested or ordered in this encounter          DAREN Mason, JOANNA-BC  12/28/2021   11:09 AM

## 2021-12-31 ENCOUNTER — PATIENT MESSAGE (OUTPATIENT)
Dept: FAMILY MEDICINE CLINIC | Facility: CLINIC | Age: 56
End: 2021-12-31

## 2022-01-03 NOTE — TELEPHONE ENCOUNTER
From: Lyndon Escobedo  To: ESPERANZA Mckeon  Sent: 12/31/2021 10:15 AM CST  Subject: Covid results     Negative

## 2022-01-07 ENCOUNTER — PATIENT MESSAGE (OUTPATIENT)
Dept: FAMILY MEDICINE CLINIC | Facility: CLINIC | Age: 57
End: 2022-01-07

## 2022-01-07 NOTE — TELEPHONE ENCOUNTER
From: Veronica Escobedo  To: Dieter Driscoll MD  Sent: 1/7/2022 1:20 PM CST  Subject: Blood test on Jan 26    I never got my results for my triglycerides so on the 26th can we check my liver and triglycerides?  I just want to make sure the order is in fo

## 2022-01-07 NOTE — TELEPHONE ENCOUNTER
Lipid, CMP, and A1C ordered entered in December 2021. Per CR- recommended to have labs at the end of Jan.    My chart message routed to pt.

## 2022-01-26 ENCOUNTER — LAB ENCOUNTER (OUTPATIENT)
Dept: LAB | Age: 57
End: 2022-01-26
Attending: FAMILY MEDICINE
Payer: COMMERCIAL

## 2022-01-26 DIAGNOSIS — R79.89 ELEVATED LIVER FUNCTION TESTS: ICD-10-CM

## 2022-01-26 DIAGNOSIS — E78.1 PURE HYPERTRIGLYCERIDEMIA: ICD-10-CM

## 2022-01-26 DIAGNOSIS — R73.09 ELEVATED GLUCOSE: ICD-10-CM

## 2022-01-26 LAB
ALBUMIN SERPL-MCNC: 3.6 G/DL (ref 3.4–5)
ALBUMIN/GLOB SERPL: 1.2 {RATIO} (ref 1–2)
ALP LIVER SERPL-CCNC: 128 U/L
ALT SERPL-CCNC: 112 U/L
ANION GAP SERPL CALC-SCNC: 5 MMOL/L (ref 0–18)
AST SERPL-CCNC: 41 U/L (ref 15–37)
BILIRUB SERPL-MCNC: 0.5 MG/DL (ref 0.1–2)
BUN BLD-MCNC: 12 MG/DL (ref 7–18)
CALCIUM BLD-MCNC: 9.2 MG/DL (ref 8.5–10.1)
CHLORIDE SERPL-SCNC: 105 MMOL/L (ref 98–112)
CHOLEST SERPL-MCNC: 397 MG/DL (ref ?–200)
CO2 SERPL-SCNC: 28 MMOL/L (ref 21–32)
CREAT BLD-MCNC: 0.74 MG/DL
EST. AVERAGE GLUCOSE BLD GHB EST-MCNC: 117 MG/DL (ref 68–126)
FASTING PATIENT LIPID ANSWER: YES
FASTING STATUS PATIENT QL REPORTED: YES
GLOBULIN PLAS-MCNC: 2.9 G/DL (ref 2.8–4.4)
GLUCOSE BLD-MCNC: 105 MG/DL (ref 70–99)
HBA1C MFR BLD: 5.7 % (ref ?–5.7)
HDLC SERPL-MCNC: 41 MG/DL (ref 40–59)
LDLC SERPL DIRECT ASSAY-MCNC: 96 MG/DL (ref ?–100)
NONHDLC SERPL-MCNC: 356 MG/DL (ref ?–130)
OSMOLALITY SERPL CALC.SUM OF ELEC: 286 MOSM/KG (ref 275–295)
POTASSIUM SERPL-SCNC: 3.7 MMOL/L (ref 3.5–5.1)
PROT SERPL-MCNC: 6.5 G/DL (ref 6.4–8.2)
SODIUM SERPL-SCNC: 138 MMOL/L (ref 136–145)
TRIGL SERPL-MCNC: 1335 MG/DL (ref 30–149)

## 2022-01-26 PROCEDURE — 83721 ASSAY OF BLOOD LIPOPROTEIN: CPT | Performed by: FAMILY MEDICINE

## 2022-01-26 PROCEDURE — 83036 HEMOGLOBIN GLYCOSYLATED A1C: CPT | Performed by: FAMILY MEDICINE

## 2022-01-26 PROCEDURE — 80061 LIPID PANEL: CPT | Performed by: FAMILY MEDICINE

## 2022-01-26 PROCEDURE — 80053 COMPREHEN METABOLIC PANEL: CPT | Performed by: FAMILY MEDICINE

## 2022-01-27 ENCOUNTER — TELEPHONE (OUTPATIENT)
Dept: FAMILY MEDICINE CLINIC | Facility: CLINIC | Age: 57
End: 2022-01-27

## 2022-01-27 NOTE — TELEPHONE ENCOUNTER
----- Message from Sofiya Dent MD sent at 1/27/2022  1:08 PM CST -----  Lab results and chart reviewed.     Pt had abdomial ultrasound showing normal liver and gallbladder 4/2021-[facility]    Liver function elevated- with only slight improvement from 6 week

## 2022-01-27 NOTE — TELEPHONE ENCOUNTER
Spoke to pt advised of CR lab results. Pt stated she was on the gemfibrozil in the past and was taken off of it because it was affecting her liver.  Pt is willing to try it again but will wantes to discuss different treatment options for her cholesterol lev

## 2022-01-28 NOTE — TELEPHONE ENCOUNTER
Pt contacted- pt advised to go to urgent care today. Pt declined. Pt states if she has increase in s/s she will go to ER. Pt offered appt with NP on Monday- pt declined. Pt wanted to wait to see PCP. Pt scheduled for appt with CR on Friday, 2/4/22.

## 2022-01-28 NOTE — TELEPHONE ENCOUNTER
Pt should have sooner appointment before starting any medication. She needs evaluation of her abdominal pain, abnormal urine and other concerns.

## 2022-02-04 ENCOUNTER — OFFICE VISIT (OUTPATIENT)
Dept: FAMILY MEDICINE CLINIC | Facility: CLINIC | Age: 57
End: 2022-02-04
Payer: COMMERCIAL

## 2022-02-04 ENCOUNTER — TELEPHONE (OUTPATIENT)
Dept: FAMILY MEDICINE CLINIC | Facility: CLINIC | Age: 57
End: 2022-02-04

## 2022-02-04 VITALS
TEMPERATURE: 98 F | RESPIRATION RATE: 18 BRPM | HEIGHT: 61 IN | BODY MASS INDEX: 24.73 KG/M2 | WEIGHT: 131 LBS | OXYGEN SATURATION: 98 % | SYSTOLIC BLOOD PRESSURE: 120 MMHG | DIASTOLIC BLOOD PRESSURE: 86 MMHG | HEART RATE: 78 BPM

## 2022-02-04 DIAGNOSIS — R30.0 DYSURIA: ICD-10-CM

## 2022-02-04 DIAGNOSIS — Z85.3 HISTORY OF BREAST CANCER: ICD-10-CM

## 2022-02-04 DIAGNOSIS — E78.1 PURE HYPERTRIGLYCERIDEMIA: ICD-10-CM

## 2022-02-04 DIAGNOSIS — R10.9 ABDOMINAL PAIN, UNSPECIFIED ABDOMINAL LOCATION: Primary | ICD-10-CM

## 2022-02-04 DIAGNOSIS — R74.8 ELEVATED LIVER ENZYMES: ICD-10-CM

## 2022-02-04 LAB
APPEARANCE: CLEAR
BILIRUB UR QL STRIP.AUTO: NEGATIVE
BILIRUBIN: NEGATIVE
COLOR UR AUTO: YELLOW
GLUCOSE (URINE DIPSTICK): NEGATIVE MG/DL
GLUCOSE UR STRIP.AUTO-MCNC: NEGATIVE MG/DL
KETONES (URINE DIPSTICK): NEGATIVE MG/DL
KETONES UR STRIP.AUTO-MCNC: NEGATIVE MG/DL
LEUKOCYTE ESTERASE UR QL STRIP.AUTO: NEGATIVE
MULTISTIX LOT#: ABNORMAL NUMERIC
NITRITE UR QL STRIP.AUTO: NEGATIVE
NITRITE, URINE: NEGATIVE
OCCULT BLOOD: NEGATIVE
PH UR STRIP.AUTO: 6 [PH] (ref 5–8)
PH, URINE: 7 (ref 4.5–8)
PROT UR STRIP.AUTO-MCNC: NEGATIVE MG/DL
PROTEIN (URINE DIPSTICK): NEGATIVE MG/DL
RBC UR QL AUTO: NEGATIVE
SP GR UR STRIP.AUTO: 1.01 (ref 1–1.03)
SPECIFIC GRAVITY: 1.02 (ref 1–1.03)
URINE-COLOR: YELLOW
UROBILINOGEN UR STRIP.AUTO-MCNC: <2 MG/DL
UROBILINOGEN,SEMI-QN: 0.2 MG/DL (ref 0–1.9)

## 2022-02-04 PROCEDURE — 81001 URINALYSIS AUTO W/SCOPE: CPT | Performed by: FAMILY MEDICINE

## 2022-02-04 PROCEDURE — 81003 URINALYSIS AUTO W/O SCOPE: CPT | Performed by: FAMILY MEDICINE

## 2022-02-04 PROCEDURE — 99214 OFFICE O/P EST MOD 30 MIN: CPT | Performed by: FAMILY MEDICINE

## 2022-02-04 PROCEDURE — 3008F BODY MASS INDEX DOCD: CPT | Performed by: FAMILY MEDICINE

## 2022-02-04 PROCEDURE — 3074F SYST BP LT 130 MM HG: CPT | Performed by: FAMILY MEDICINE

## 2022-02-04 PROCEDURE — 87086 URINE CULTURE/COLONY COUNT: CPT | Performed by: FAMILY MEDICINE

## 2022-02-04 PROCEDURE — 3079F DIAST BP 80-89 MM HG: CPT | Performed by: FAMILY MEDICINE

## 2022-02-04 NOTE — PATIENT INSTRUCTIONS
Pt to referral [place] [facility] lipid [facility]    Check urine today    Hydrate well, cranberry juice

## 2022-02-04 NOTE — TELEPHONE ENCOUNTER
[facility] Dr. Coleen Molina.

## 2022-02-10 ENCOUNTER — LABORATORY ENCOUNTER (OUTPATIENT)
Dept: LAB | Age: 57
End: 2022-02-10
Attending: INTERNAL MEDICINE
Payer: COMMERCIAL

## 2022-02-10 ENCOUNTER — PATIENT MESSAGE (OUTPATIENT)
Dept: FAMILY MEDICINE CLINIC | Facility: CLINIC | Age: 57
End: 2022-02-10

## 2022-02-10 DIAGNOSIS — R73.9 HYPERGLYCEMIA: Primary | ICD-10-CM

## 2022-02-10 DIAGNOSIS — R74.8 LIVER ENZYME ELEVATION: ICD-10-CM

## 2022-02-10 DIAGNOSIS — E78.5 HYPERLIPIDEMIA: ICD-10-CM

## 2022-02-10 LAB
ALBUMIN SERPL-MCNC: 3.8 G/DL (ref 3.4–5)
ALBUMIN/GLOB SERPL: 1.3 {RATIO} (ref 1–2)
ALP LIVER SERPL-CCNC: 115 U/L
ALT SERPL-CCNC: 68 U/L
ANION GAP SERPL CALC-SCNC: 7 MMOL/L (ref 0–18)
AST SERPL-CCNC: 29 U/L (ref 15–37)
BILIRUB SERPL-MCNC: 0.6 MG/DL (ref 0.1–2)
BUN BLD-MCNC: 16 MG/DL (ref 7–18)
CALCIUM BLD-MCNC: 10.1 MG/DL (ref 8.5–10.1)
CHLORIDE SERPL-SCNC: 105 MMOL/L (ref 98–112)
CHOLEST SERPL-MCNC: 267 MG/DL (ref ?–200)
CO2 SERPL-SCNC: 28 MMOL/L (ref 21–32)
CREAT BLD-MCNC: 0.94 MG/DL
EST. AVERAGE GLUCOSE BLD GHB EST-MCNC: 111 MG/DL (ref 68–126)
FASTING PATIENT LIPID ANSWER: YES
FASTING STATUS PATIENT QL REPORTED: YES
GLOBULIN PLAS-MCNC: 2.9 G/DL (ref 2.8–4.4)
GLUCOSE BLD-MCNC: 111 MG/DL (ref 70–99)
HBA1C MFR BLD: 5.5 % (ref ?–5.7)
HDLC SERPL-MCNC: 33 MG/DL (ref 40–59)
LDLC SERPL CALC-MCNC: 135 MG/DL (ref ?–100)
NONHDLC SERPL-MCNC: 234 MG/DL (ref ?–130)
OSMOLALITY SERPL CALC.SUM OF ELEC: 292 MOSM/KG (ref 275–295)
POTASSIUM SERPL-SCNC: 4.2 MMOL/L (ref 3.5–5.1)
PROT SERPL-MCNC: 6.7 G/DL (ref 6.4–8.2)
SODIUM SERPL-SCNC: 140 MMOL/L (ref 136–145)
TRIGL SERPL-MCNC: 533 MG/DL (ref 30–149)
TSI SER-ACNC: 2.08 MIU/ML (ref 0.36–3.74)
VLDLC SERPL CALC-MCNC: 103 MG/DL (ref 0–30)

## 2022-02-10 PROCEDURE — 80053 COMPREHEN METABOLIC PANEL: CPT

## 2022-02-10 PROCEDURE — 84443 ASSAY THYROID STIM HORMONE: CPT

## 2022-02-10 PROCEDURE — 83036 HEMOGLOBIN GLYCOSYLATED A1C: CPT

## 2022-02-10 PROCEDURE — 36415 COLL VENOUS BLD VENIPUNCTURE: CPT

## 2022-02-10 PROCEDURE — 80061 LIPID PANEL: CPT

## 2022-02-11 NOTE — TELEPHONE ENCOUNTER
From: Ephraim Escobedo  To: Ebenezer Jeronimo MD  Sent: 2/10/2022 10:18 PM CST  Subject: Question regarding COMP METABOLIC PANEL (14)    Dr Cherelle Chakraborty order more blood tests, could you please look at them? He put me back on gemsibrafil 600mg 2 x day. No fish oil yet.

## 2022-02-11 NOTE — TELEPHONE ENCOUNTER
Reviewed recent thyroid lab- is normal and reassuring. Well biotin supplements can help hair and nail growth, if you are eating healthy diet with plenty of fruits and vegetables additional supplementation should not be needed. Biotin should not be taken within 3 to 5 days of having any laboratory study done as it may interfere with chemical assays.

## 2022-03-15 RX ORDER — GABAPENTIN 300 MG/1
CAPSULE ORAL
Qty: 540 CAPSULE | Refills: 3 | Status: SHIPPED | OUTPATIENT
Start: 2022-03-15

## 2022-03-15 NOTE — TELEPHONE ENCOUNTER
Future appt: Your appointments     Date & Time Appointment Department [facility])    May 06, 2022  8:30 AM CDT Laboratory Visit with REF [facility] ([facility])            [facility] Reference Lab  ED[facility] Ref Lab [address] [address]  [phone]        Last Appointment with provider:   2/4/2022 for medical follow up. Last physical was on 12/15/21. Last appointment at [facility]:  2/4/2022  Cholesterol, Total   Date Value   02/10/2022 267 mg/dL (H)   11/19/2018 190     Total Cholesterol (mg/dL)   Date Value   11/18/2020 213 (H)     HDL Cholesterol (mg/dL)   Date Value   02/10/2022 33 (L)   11/18/2020 32 (L)     LDL Cholesterol (mg/dL)   Date Value   02/10/2022 135 (H)   11/18/2020 108 (H)     Triglycerides (mg/dL)   Date Value   02/10/2022 533 (H)   11/18/2020 366 (H)   07/17/2017 225     Lab Results   Component Value Date     02/10/2022    A1C 5.5 02/10/2022     Lab Results   Component Value Date    TSH 2.080 02/10/2022       No follow-ups on file.

## 2022-05-06 ENCOUNTER — LAB ENCOUNTER (OUTPATIENT)
Dept: LAB | Age: 57
End: 2022-05-06
Attending: FAMILY MEDICINE
Payer: COMMERCIAL

## 2022-05-06 DIAGNOSIS — R74.8 LIVER ENZYME ELEVATION: ICD-10-CM

## 2022-05-06 DIAGNOSIS — E78.1 FAMILIAL HYPERTRIGLYCERIDEMIA: Primary | ICD-10-CM

## 2022-05-06 DIAGNOSIS — E78.5 HYPERLIPIDEMIA: ICD-10-CM

## 2022-05-06 DIAGNOSIS — R73.9 HYPERGLYCEMIA: ICD-10-CM

## 2022-05-06 LAB
ALBUMIN SERPL-MCNC: 4 G/DL (ref 3.4–5)
ALBUMIN/GLOB SERPL: 1.4 {RATIO} (ref 1–2)
ALP LIVER SERPL-CCNC: 130 U/L
ALT SERPL-CCNC: 34 U/L
ANION GAP SERPL CALC-SCNC: 7 MMOL/L (ref 0–18)
AST SERPL-CCNC: 21 U/L (ref 15–37)
BILIRUB SERPL-MCNC: 0.5 MG/DL (ref 0.1–2)
BUN BLD-MCNC: 21 MG/DL (ref 7–18)
CALCIUM BLD-MCNC: 10 MG/DL (ref 8.5–10.1)
CHLORIDE SERPL-SCNC: 106 MMOL/L (ref 98–112)
CHOLEST SERPL-MCNC: 198 MG/DL (ref ?–200)
CO2 SERPL-SCNC: 28 MMOL/L (ref 21–32)
CREAT BLD-MCNC: 0.97 MG/DL
EST. AVERAGE GLUCOSE BLD GHB EST-MCNC: 111 MG/DL (ref 68–126)
FASTING PATIENT LIPID ANSWER: YES
FASTING STATUS PATIENT QL REPORTED: YES
GLOBULIN PLAS-MCNC: 2.8 G/DL (ref 2.8–4.4)
GLUCOSE BLD-MCNC: 108 MG/DL (ref 70–99)
HBA1C MFR BLD: 5.5 % (ref ?–5.7)
HDLC SERPL-MCNC: 34 MG/DL (ref 40–59)
LDLC SERPL CALC-MCNC: 89 MG/DL (ref ?–100)
NONHDLC SERPL-MCNC: 164 MG/DL (ref ?–130)
OSMOLALITY SERPL CALC.SUM OF ELEC: 296 MOSM/KG (ref 275–295)
POTASSIUM SERPL-SCNC: 4.1 MMOL/L (ref 3.5–5.1)
PROT SERPL-MCNC: 6.8 G/DL (ref 6.4–8.2)
SODIUM SERPL-SCNC: 141 MMOL/L (ref 136–145)
TRIGL SERPL-MCNC: 460 MG/DL (ref 30–149)
VLDLC SERPL CALC-MCNC: 76 MG/DL (ref 0–30)

## 2022-05-06 PROCEDURE — 80061 LIPID PANEL: CPT

## 2022-05-06 PROCEDURE — 80053 COMPREHEN METABOLIC PANEL: CPT

## 2022-05-06 PROCEDURE — 36415 COLL VENOUS BLD VENIPUNCTURE: CPT

## 2022-05-06 PROCEDURE — 83036 HEMOGLOBIN GLYCOSYLATED A1C: CPT

## 2022-07-18 RX ORDER — OMEGA-3-ACID ETHYL ESTERS 1 G/1
CAPSULE, LIQUID FILLED ORAL
Qty: 360 CAPSULE | Refills: 0 | Status: SHIPPED | OUTPATIENT
Start: 2022-07-18

## 2022-07-18 NOTE — TELEPHONE ENCOUNTER
Future appt: Your appointments     Date & Time Appointment Department [facility])    Aug 01, 2022  7:00 AM CDT CARD ECHO 2D with [address] CARD ECHO RM [address] Echocardi[facility])    Please wear slacks and a top for your comfort. Do not wear a dress. Aug 10, 2022  8:30 AM CDT Laboratory Visit with REF [facility] ([facility] Ref [facility])            [facility] Echocardiography  [facility]  [id number]  [address] [facility] Reference Lab  [facility] Ref [facility] [place]  [address] [id number]  [phone]        Last Appointment with provider:   2/4/2022  Last appointment at [facility]:  2/4/2022  Last px: 12/15/21    Omega 3 refilled 11/1/21 for #360, 0 refills,  Also refilled 2/16/21 for one year. Cholesterol, Total   Date Value   05/06/2022 198 mg/dL   11/19/2018 190     Total Cholesterol (mg/dL)   Date Value   11/18/2020 213 (H)     HDL Cholesterol (mg/dL)   Date Value   05/06/2022 34 (L)   11/18/2020 32 (L)     LDL Cholesterol (mg/dL)   Date Value   05/06/2022 89   11/18/2020 108 (H)     Triglycerides (mg/dL)   Date Value   05/06/2022 460 (H)   11/18/2020 366 (H)   07/17/2017 225     Lab Results   Component Value Date     05/06/2022    A1C 5.5 05/06/2022     Lab Results   Component Value Date    TSH 2.080 02/10/2022       No follow-ups on file.

## 2022-08-01 ENCOUNTER — HOSPITAL ENCOUNTER (OUTPATIENT)
Dept: CV DIAGNOSTICS | Facility: HOSPITAL | Age: 57
Discharge: HOME | End: 2022-08-01
Attending: NURSE PRACTITIONER
Payer: COMMERCIAL

## 2022-08-01 DIAGNOSIS — R01.1 MURMUR, HEART: ICD-10-CM

## 2022-08-01 PROCEDURE — 93306 TTE W/DOPPLER COMPLETE: CPT | Performed by: NURSE PRACTITIONER

## 2022-08-10 ENCOUNTER — LABORATORY ENCOUNTER (OUTPATIENT)
Dept: LAB | Age: 57
End: 2022-08-10
Attending: FAMILY MEDICINE
Payer: COMMERCIAL

## 2022-08-10 DIAGNOSIS — E78.5 HYPERLIPEMIA: ICD-10-CM

## 2022-08-10 DIAGNOSIS — M79.644 THUMB PAIN, RIGHT: ICD-10-CM

## 2022-08-10 DIAGNOSIS — R74.8 LIVER ENZYME ELEVATION: ICD-10-CM

## 2022-08-10 LAB
ALBUMIN SERPL-MCNC: 4.1 G/DL (ref 3.4–5)
ALBUMIN/GLOB SERPL: 1.3 {RATIO} (ref 1–2)
ALP LIVER SERPL-CCNC: 187 U/L
ALT SERPL-CCNC: 36 U/L
ANION GAP SERPL CALC-SCNC: 7 MMOL/L (ref 0–18)
AST SERPL-CCNC: 29 U/L (ref 15–37)
BILIRUB SERPL-MCNC: 0.5 MG/DL (ref 0.1–2)
BUN BLD-MCNC: 18 MG/DL (ref 7–18)
C3 SERPL-MCNC: 128 MG/DL (ref 90–180)
C4 SERPL-MCNC: 24.7 MG/DL (ref 10–40)
CALCIUM BLD-MCNC: 10.2 MG/DL (ref 8.5–10.1)
CHLORIDE SERPL-SCNC: 107 MMOL/L (ref 98–112)
CHOLEST SERPL-MCNC: 232 MG/DL (ref ?–200)
CO2 SERPL-SCNC: 25 MMOL/L (ref 21–32)
CREAT BLD-MCNC: 0.88 MG/DL
CRP SERPL-MCNC: <0.29 MG/DL (ref ?–0.3)
ERYTHROCYTE [SEDIMENTATION RATE] IN BLOOD: 12 MM/HR
FASTING PATIENT LIPID ANSWER: YES
FASTING STATUS PATIENT QL REPORTED: YES
GFR SERPLBLD BASED ON 1.73 SQ M-ARVRAT: 76 ML/MIN/1.73M2 (ref 60–?)
GLOBULIN PLAS-MCNC: 3.1 G/DL (ref 2.8–4.4)
GLUCOSE BLD-MCNC: 91 MG/DL (ref 70–99)
HDLC SERPL-MCNC: 28 MG/DL (ref 40–59)
LDLC SERPL DIRECT ASSAY-MCNC: 81 MG/DL (ref ?–100)
NONHDLC SERPL-MCNC: 204 MG/DL (ref ?–130)
OSMOLALITY SERPL CALC.SUM OF ELEC: 289 MOSM/KG (ref 275–295)
POTASSIUM SERPL-SCNC: 3.7 MMOL/L (ref 3.5–5.1)
PROT SERPL-MCNC: 7.2 G/DL (ref 6.4–8.2)
RHEUMATOID FACT SERPL-ACNC: <10 IU/ML (ref ?–15)
SODIUM SERPL-SCNC: 139 MMOL/L (ref 136–145)
TRIGL SERPL-MCNC: 973 MG/DL (ref 30–149)
URATE SERPL-MCNC: 5.3 MG/DL

## 2022-08-10 PROCEDURE — 86038 ANTINUCLEAR ANTIBODIES: CPT | Performed by: FAMILY MEDICINE

## 2022-08-12 LAB — CCP IGG SERPL-ACNC: 0.7 U/ML (ref 0–6.9)

## 2022-08-13 LAB — ANA SCREEN, IGG BY ELISA: DETECTED

## 2022-08-15 ENCOUNTER — TELEPHONE (OUTPATIENT)
Dept: FAMILY MEDICINE CLINIC | Facility: CLINIC | Age: 57
End: 2022-08-15

## 2022-08-15 NOTE — TELEPHONE ENCOUNTER
Pt states she was referred to rheumatology per Dr. Dong Dacosta. ELANA lab result faxed to Dr. Christopher Matthew as requested by pt.

## 2022-08-15 NOTE — TELEPHONE ENCOUNTER
Pt should discuss results with DR. Guillermina Kaplan and make plan for care and f.u  She can make appointment for medical f.u with me if further questions, concerns    No future appointments.

## 2022-08-15 NOTE — TELEPHONE ENCOUNTER
Pt called in response to positive lab report on 8/10/22. Lab report faxed to Dr. Angeles Reed. Pt does not have rheumatology- will need referral.      Also- pt mentioned that her sister was dx with Hashimoto's recently. Pt asked if she should get tested. Pt informed she had a normal tsh value on 2/10/22 - ordered by Dr. Ayan Diamond.

## 2022-09-13 ENCOUNTER — TELEPHONE (OUTPATIENT)
Dept: FAMILY MEDICINE CLINIC | Facility: CLINIC | Age: 57
End: 2022-09-13

## 2022-09-13 DIAGNOSIS — Z00.00 ANNUAL PHYSICAL EXAM: Primary | ICD-10-CM

## 2022-09-13 DIAGNOSIS — E78.1 PURE HYPERTRIGLYCERIDEMIA: ICD-10-CM

## 2022-09-13 DIAGNOSIS — R73.09 ELEVATED GLUCOSE: ICD-10-CM

## 2022-09-13 NOTE — TELEPHONE ENCOUNTER
Pt informed re: lab orders. Pt also informed appt needed to discuss need for any new referral orders. Pt states she would like referral at this time. Pt wanted to schedule appt with CR prior to Dec -  Pt scheduled on 9/23/22.     Future Appointments   Date Time Provider Vicente Estrada   9/23/2022  2:00 PM Kevin Argueta MD [facility] [place]   11/17/2022  8:00 AM REF [place] [facility]

## 2022-09-13 NOTE — TELEPHONE ENCOUNTER
patient has bloodwork scheduled for 11-17 for an outside dr, does dr Urszula Suazo want to add any to this visit, if so order needed

## 2022-09-13 NOTE — TELEPHONE ENCOUNTER
Pt mentioned she dropped off lab orders today, ordered by Dr. Lina Barrett. Checks orders that were dropped off in lab. Pt had Lipid and CMP ordered on 8/31/22. Pt has appt for lab scheduled on 11/7/22- pt asked if CR would add any additional orders? Pt is due for px in December- pt states she will get that appt scheduled. Last px: 12/15/21    Pt states her cardiologist gave her an order to go to dietician due to her hx of high triglycerides. Pt states she doesn't want to go to [facility]. Pt asked if CR would enter a referral for dietician at [facility].     Future Appointments   Date Time Provider Vicente Estrada   11/17/2022  8:00 AM [facility]F [place] [facility]

## 2022-09-13 NOTE — TELEPHONE ENCOUNTER
Pt can have added annual physical health panel if wants to have drawn at same time    Cardiology can adjust her referral to provider of her choice.     Future Appointments   Date Time Provider Vicente Estrada   11/17/2022  8:00 AM [facility]F [place] [facility]

## 2022-09-23 ENCOUNTER — OFFICE VISIT (OUTPATIENT)
Dept: FAMILY MEDICINE CLINIC | Facility: CLINIC | Age: 57
End: 2022-09-23

## 2022-09-23 ENCOUNTER — PATIENT MESSAGE (OUTPATIENT)
Dept: FAMILY MEDICINE CLINIC | Facility: CLINIC | Age: 57
End: 2022-09-23

## 2022-09-23 VITALS
HEART RATE: 64 BPM | RESPIRATION RATE: 24 BRPM | HEIGHT: 61 IN | TEMPERATURE: 98 F | SYSTOLIC BLOOD PRESSURE: 138 MMHG | BODY MASS INDEX: 25.56 KG/M2 | WEIGHT: 135.38 LBS | DIASTOLIC BLOOD PRESSURE: 78 MMHG

## 2022-09-23 DIAGNOSIS — E78.1 FAMILIAL HYPERTRIGLYCERIDEMIA: Primary | ICD-10-CM

## 2022-09-23 DIAGNOSIS — E78.1 PURE HYPERTRIGLYCERIDEMIA: ICD-10-CM

## 2022-09-23 DIAGNOSIS — R73.09 ELEVATED GLUCOSE: ICD-10-CM

## 2022-09-23 PROCEDURE — 3008F BODY MASS INDEX DOCD: CPT | Performed by: FAMILY MEDICINE

## 2022-09-23 PROCEDURE — 3078F DIAST BP <80 MM HG: CPT | Performed by: FAMILY MEDICINE

## 2022-09-23 PROCEDURE — 99213 OFFICE O/P EST LOW 20 MIN: CPT | Performed by: FAMILY MEDICINE

## 2022-09-23 PROCEDURE — 3075F SYST BP GE 130 - 139MM HG: CPT | Performed by: FAMILY MEDICINE

## 2022-09-23 RX ORDER — COLCHICINE 0.6 MG/1
0.6 TABLET ORAL 2 TIMES DAILY
COMMUNITY
Start: 2022-09-01

## 2022-09-23 RX ORDER — ICOSAPENT ETHYL 1000 MG/1
1 CAPSULE ORAL 2 TIMES DAILY
COMMUNITY
Start: 2022-09-21

## 2022-09-23 RX ORDER — CEFADROXIL 500 MG/1
CAPSULE ORAL
COMMUNITY
Start: 2022-05-09

## 2022-09-23 RX ORDER — GEMFIBROZIL 600 MG/1
600 TABLET, FILM COATED ORAL 2 TIMES DAILY
COMMUNITY
Start: 2022-09-21

## 2022-09-23 RX ORDER — METHYLPREDNISOLONE 4 MG
1 TABLET, DOSE PACK ORAL DAILY
COMMUNITY
Start: 2022-02-07

## 2022-09-23 RX ORDER — AMITRIPTYLINE HYDROCHLORIDE 10 MG/1
10 TABLET, FILM COATED ORAL NIGHTLY
COMMUNITY
Start: 2022-04-26

## 2022-09-23 RX ORDER — GLUCOSAMINE HCL 500 MG
200 TABLET ORAL DAILY
COMMUNITY
Start: 2022-02-07

## 2022-09-23 NOTE — TELEPHONE ENCOUNTER
From: Kim Simon  To: Ebenezer Jeronimo MD  Sent: 9/23/2022 3:08 PM CDT  Subject: Rexanne Schirmer    I take Vasepa 2x per day

## 2022-09-24 PROBLEM — E78.1 FAMILIAL HYPERTRIGLYCERIDEMIA: Status: ACTIVE | Noted: 2022-02-07

## 2022-09-24 PROBLEM — K85.90 ACUTE PANCREATITIS WITHOUT NECROSIS OR INFECTION, UNSPECIFIED: Status: RESOLVED | Noted: 2022-02-07 | Resolved: 2022-09-24

## 2022-09-24 PROBLEM — R73.9 HYPERGLYCEMIA: Status: ACTIVE | Noted: 2022-02-07

## 2022-09-24 PROBLEM — K85.90 ACUTE PANCREATITIS WITHOUT NECROSIS OR INFECTION, UNSPECIFIED: Status: ACTIVE | Noted: 2022-02-07

## 2022-09-24 PROBLEM — R10.9 ABDOMINAL PAIN: Status: RESOLVED | Noted: 2022-02-04 | Resolved: 2022-09-24

## 2022-11-07 RX ORDER — GABAPENTIN 300 MG/1
600 CAPSULE ORAL 3 TIMES DAILY
Qty: 540 CAPSULE | Refills: 1 | Status: SHIPPED | OUTPATIENT
Start: 2022-11-07

## 2022-11-07 NOTE — TELEPHONE ENCOUNTER
Future appt: Your appointments     Date & Time Appointment Department [facility])    Nov 17, 2022  8:00 AM CST what is this with REF [facility] Radha Rodriguez Dalton ([facility] [place])        Dec 20, 2022  8:00 AM CST Follow Up Visit with Sidra Kunz MD [address] [facility] ([facility])            Radha Rodriguez Dalton  [facility]  [id number]  [address] [place] [address]  [phone]        Last Appointment with provider:   9/23/2022 for lab review, lipids. Last appointment at [facility]:  9/23/2022  Cholesterol, Total   Date Value   08/10/2022 232 mg/dL (H)   11/19/2018 190     Total Cholesterol (mg/dL)   Date Value   11/18/2020 213 (H)     HDL Cholesterol (mg/dL)   Date Value   08/10/2022 28 (L)   11/18/2020 32 (L)     LDL Cholesterol   Date Value   08/10/2022      Comment:     Unable to calculate LDL due to Triglycerides >800 mg/dL        Desirable <100 mg/dL   Borderline 100-129 mg/dL   High     >=130mg/dL       11/18/2020 108 mg/dL (H)     Triglycerides (mg/dL)   Date Value   08/10/2022 973 (H)   11/18/2020 366 (H)   07/17/2017 225     Lab Results   Component Value Date     05/06/2022    A1C 5.5 05/06/2022     Lab Results   Component Value Date    TSH 2.080 02/10/2022       No follow-ups on file.

## 2022-11-16 ENCOUNTER — LABORATORY ENCOUNTER (OUTPATIENT)
Dept: LAB | Age: 57
End: 2022-11-16
Attending: INTERNAL MEDICINE
Payer: COMMERCIAL

## 2022-11-16 DIAGNOSIS — R73.09 ELEVATED GLUCOSE: ICD-10-CM

## 2022-11-16 DIAGNOSIS — R73.9 HYPERGLYCEMIA: ICD-10-CM

## 2022-11-16 DIAGNOSIS — E78.1 PURE HYPERTRIGLYCERIDEMIA: ICD-10-CM

## 2022-11-16 DIAGNOSIS — Z00.00 ANNUAL PHYSICAL EXAM: ICD-10-CM

## 2022-11-16 DIAGNOSIS — E78.1 FAMILIAL HYPERTRIGLYCERIDEMIA: ICD-10-CM

## 2022-11-16 DIAGNOSIS — E78.1 PURE HYPERGLYCERIDEMIA: ICD-10-CM

## 2022-11-16 DIAGNOSIS — E78.5 HYPERLIPIDEMIA: ICD-10-CM

## 2022-11-16 DIAGNOSIS — R01.1 MURMUR, HEART: ICD-10-CM

## 2022-11-16 LAB
ALBUMIN SERPL-MCNC: 4.1 G/DL (ref 3.4–5)
ALBUMIN/GLOB SERPL: 1.6 {RATIO} (ref 1–2)
ALP LIVER SERPL-CCNC: 168 U/L
ALT SERPL-CCNC: 28 U/L
ANION GAP SERPL CALC-SCNC: 3 MMOL/L (ref 0–18)
AST SERPL-CCNC: 21 U/L (ref 15–37)
BASOPHILS # BLD AUTO: 0.05 X10(3) UL (ref 0–0.2)
BASOPHILS NFR BLD AUTO: 1.4 %
BILIRUB SERPL-MCNC: 0.4 MG/DL (ref 0.1–2)
BILIRUB UR QL STRIP.AUTO: NEGATIVE
BUN BLD-MCNC: 26 MG/DL (ref 7–18)
CALCIUM BLD-MCNC: 10 MG/DL (ref 8.5–10.1)
CHLORIDE SERPL-SCNC: 108 MMOL/L (ref 98–112)
CHOLEST SERPL-MCNC: 185 MG/DL (ref ?–200)
CO2 SERPL-SCNC: 31 MMOL/L (ref 21–32)
COLOR UR AUTO: YELLOW
CREAT BLD-MCNC: 0.82 MG/DL
EOSINOPHIL # BLD AUTO: 0.31 X10(3) UL (ref 0–0.7)
EOSINOPHIL NFR BLD AUTO: 9 %
ERYTHROCYTE [DISTWIDTH] IN BLOOD BY AUTOMATED COUNT: 12.2 %
EST. AVERAGE GLUCOSE BLD GHB EST-MCNC: 111 MG/DL (ref 68–126)
FASTING PATIENT LIPID ANSWER: YES
FASTING STATUS PATIENT QL REPORTED: YES
GFR SERPLBLD BASED ON 1.73 SQ M-ARVRAT: 83 ML/MIN/1.73M2 (ref 60–?)
GLOBULIN PLAS-MCNC: 2.5 G/DL (ref 2.8–4.4)
GLUCOSE BLD-MCNC: 93 MG/DL (ref 70–99)
GLUCOSE UR STRIP.AUTO-MCNC: NEGATIVE MG/DL
HBA1C MFR BLD: 5.5 % (ref ?–5.7)
HCT VFR BLD AUTO: 42.1 %
HDLC SERPL-MCNC: 37 MG/DL (ref 40–59)
HGB BLD-MCNC: 13.9 G/DL
IMM GRANULOCYTES # BLD AUTO: 0 X10(3) UL (ref 0–1)
IMM GRANULOCYTES NFR BLD: 0 %
KETONES UR STRIP.AUTO-MCNC: NEGATIVE MG/DL
LDLC SERPL CALC-MCNC: 98 MG/DL (ref ?–100)
LEUKOCYTE ESTERASE UR QL STRIP.AUTO: NEGATIVE
LYMPHOCYTES # BLD AUTO: 1.41 X10(3) UL (ref 1–4)
LYMPHOCYTES NFR BLD AUTO: 40.9 %
MAGNESIUM SERPL-MCNC: 1.8 MG/DL (ref 1.6–2.6)
MCH RBC QN AUTO: 30 PG (ref 26–34)
MCHC RBC AUTO-ENTMCNC: 33 G/DL (ref 31–37)
MCV RBC AUTO: 90.7 FL
MONOCYTES # BLD AUTO: 0.34 X10(3) UL (ref 0.1–1)
MONOCYTES NFR BLD AUTO: 9.9 %
NEUTROPHILS # BLD AUTO: 1.34 X10 (3) UL (ref 1.5–7.7)
NEUTROPHILS # BLD AUTO: 1.34 X10(3) UL (ref 1.5–7.7)
NEUTROPHILS NFR BLD AUTO: 38.8 %
NITRITE UR QL STRIP.AUTO: NEGATIVE
NONHDLC SERPL-MCNC: 148 MG/DL (ref ?–130)
OSMOLALITY SERPL CALC.SUM OF ELEC: 298 MOSM/KG (ref 275–295)
PH UR STRIP.AUTO: 5 [PH] (ref 5–8)
PLATELET # BLD AUTO: 227 10(3)UL (ref 150–450)
POTASSIUM SERPL-SCNC: 4.2 MMOL/L (ref 3.5–5.1)
PROT SERPL-MCNC: 6.6 G/DL (ref 6.4–8.2)
PROT UR STRIP.AUTO-MCNC: NEGATIVE MG/DL
RBC # BLD AUTO: 4.64 X10(6)UL
RBC UR QL AUTO: NEGATIVE
SODIUM SERPL-SCNC: 142 MMOL/L (ref 136–145)
SP GR UR STRIP.AUTO: 1.02 (ref 1–1.03)
TRIGL SERPL-MCNC: 299 MG/DL (ref 30–149)
TSI SER-ACNC: 3.56 MIU/ML (ref 0.36–3.74)
UROBILINOGEN UR STRIP.AUTO-MCNC: <2 MG/DL
VLDLC SERPL CALC-MCNC: 50 MG/DL (ref 0–30)
WBC # BLD AUTO: 3.5 X10(3) UL (ref 4–11)

## 2022-11-16 PROCEDURE — 81001 URINALYSIS AUTO W/SCOPE: CPT | Performed by: FAMILY MEDICINE

## 2022-11-16 PROCEDURE — 85025 COMPLETE CBC W/AUTO DIFF WBC: CPT | Performed by: FAMILY MEDICINE

## 2022-11-16 PROCEDURE — 83735 ASSAY OF MAGNESIUM: CPT | Performed by: FAMILY MEDICINE

## 2022-11-16 PROCEDURE — 84443 ASSAY THYROID STIM HORMONE: CPT | Performed by: FAMILY MEDICINE

## 2022-11-16 PROCEDURE — 83036 HEMOGLOBIN GLYCOSYLATED A1C: CPT | Performed by: FAMILY MEDICINE

## 2022-11-22 ENCOUNTER — TELEPHONE (OUTPATIENT)
Dept: FAMILY MEDICINE CLINIC | Facility: CLINIC | Age: 57
End: 2022-11-22

## 2022-11-22 NOTE — TELEPHONE ENCOUNTER
Labs drawn 11/16/22. Received my chart notification re: un reviewed lab result. Pt contacted- recommendations reviewed with pt. Please see lab report. Lab report faxed to Dr. Edouard Crawford- [facility]. Pt mentioned she was going to see oncology and cardiology on 11/30/22.

## 2022-12-01 ENCOUNTER — TELEPHONE (OUTPATIENT)
Dept: FAMILY MEDICINE CLINIC | Facility: CLINIC | Age: 57
End: 2022-12-01

## 2022-12-01 DIAGNOSIS — R79.89 ABNORMAL CBC: Primary | ICD-10-CM

## 2022-12-01 NOTE — TELEPHONE ENCOUNTER
CMP and Lipid order in chart from Dr. Elise Valadez. Please advise if you would like to add any other lab orders.

## 2022-12-01 NOTE — TELEPHONE ENCOUNTER
Future Appointments   Date Time Provider Vicente Natalie   12/20/2022  8:00 AM Kevin Argueta MD [facility]   3/28/2023  8:00 AM [facility] [place]     Patient is having labs drawn in March for Dr Faye Meneses. Do you want to add anything?

## 2022-12-01 NOTE — TELEPHONE ENCOUNTER
----- Message from Arnel Egan sent at 12/1/2022  4:11 PM CST -----  Return call. [phone]. Going in for labs in April 2023.

## 2022-12-20 ENCOUNTER — OFFICE VISIT (OUTPATIENT)
Dept: FAMILY MEDICINE CLINIC | Facility: CLINIC | Age: 57
End: 2022-12-20
Payer: COMMERCIAL

## 2022-12-20 VITALS
WEIGHT: 139.19 LBS | HEART RATE: 79 BPM | RESPIRATION RATE: 16 BRPM | DIASTOLIC BLOOD PRESSURE: 72 MMHG | OXYGEN SATURATION: 99 % | SYSTOLIC BLOOD PRESSURE: 128 MMHG | TEMPERATURE: 98 F | HEIGHT: 61 IN | BODY MASS INDEX: 26.28 KG/M2

## 2022-12-20 DIAGNOSIS — E78.1 FAMILIAL HYPERTRIGLYCERIDEMIA: ICD-10-CM

## 2022-12-20 DIAGNOSIS — R73.9 HYPERGLYCEMIA: ICD-10-CM

## 2022-12-20 DIAGNOSIS — Z00.00 ANNUAL PHYSICAL EXAM: Primary | ICD-10-CM

## 2022-12-20 DIAGNOSIS — J45.20 MILD INTERMITTENT ASTHMA WITHOUT COMPLICATION: ICD-10-CM

## 2022-12-20 DIAGNOSIS — Z98.82 HX OF BREAST IMPLANTS, BILATERAL: ICD-10-CM

## 2022-12-20 DIAGNOSIS — Z85.3 HISTORY OF BREAST CANCER: ICD-10-CM

## 2022-12-20 DIAGNOSIS — M15.9 PRIMARY OSTEOARTHRITIS INVOLVING MULTIPLE JOINTS: ICD-10-CM

## 2022-12-20 PROBLEM — R30.0 DYSURIA: Status: RESOLVED | Noted: 2022-02-04 | Resolved: 2022-12-20

## 2022-12-20 PROBLEM — R01.1 MURMUR, HEART: Status: ACTIVE | Noted: 2022-06-01

## 2022-12-20 PROBLEM — E78.5 HYPERLIPIDEMIA: Status: RESOLVED | Noted: 2017-02-03 | Resolved: 2022-12-20

## 2022-12-20 PROCEDURE — 90471 IMMUNIZATION ADMIN: CPT | Performed by: FAMILY MEDICINE

## 2022-12-20 PROCEDURE — 99396 PREV VISIT EST AGE 40-64: CPT | Performed by: FAMILY MEDICINE

## 2022-12-20 PROCEDURE — 3008F BODY MASS INDEX DOCD: CPT | Performed by: FAMILY MEDICINE

## 2022-12-20 PROCEDURE — 90677 PCV20 VACCINE IM: CPT | Performed by: FAMILY MEDICINE

## 2022-12-20 PROCEDURE — 3078F DIAST BP <80 MM HG: CPT | Performed by: FAMILY MEDICINE

## 2022-12-20 PROCEDURE — 3074F SYST BP LT 130 MM HG: CPT | Performed by: FAMILY MEDICINE

## 2022-12-20 RX ORDER — GEMFIBROZIL 600 MG/1
600 TABLET, FILM COATED ORAL 2 TIMES DAILY
Qty: 90 TABLET | Refills: 1 | Status: SHIPPED | OUTPATIENT
Start: 2022-12-20

## 2022-12-20 RX ORDER — ICOSAPENT ETHYL 1000 MG/1
1 CAPSULE ORAL 2 TIMES DAILY
Qty: 120 CAPSULE | Refills: 1 | Status: SHIPPED | OUTPATIENT
Start: 2022-12-20

## 2022-12-20 NOTE — PATIENT INSTRUCTIONS
Rec pneumonia vaccione    Continue medications    Rec antihistamine and flonase for nasal and ear congestion    Rec labs in April-- cbc, chemistry, lipids

## 2022-12-23 ENCOUNTER — TELEMEDICINE (OUTPATIENT)
Dept: FAMILY MEDICINE CLINIC | Facility: CLINIC | Age: 57
End: 2022-12-23
Payer: COMMERCIAL

## 2022-12-23 ENCOUNTER — PATIENT MESSAGE (OUTPATIENT)
Dept: FAMILY MEDICINE CLINIC | Facility: CLINIC | Age: 57
End: 2022-12-23

## 2022-12-23 VITALS — TEMPERATURE: 98 F | HEART RATE: 103 BPM

## 2022-12-23 DIAGNOSIS — J40 SINOBRONCHITIS: Primary | ICD-10-CM

## 2022-12-23 DIAGNOSIS — J32.9 SINOBRONCHITIS: Primary | ICD-10-CM

## 2022-12-23 RX ORDER — AMOXICILLIN 875 MG/1
875 TABLET, COATED ORAL 2 TIMES DAILY
Qty: 20 TABLET | Refills: 0 | Status: SHIPPED | OUTPATIENT
Start: 2022-12-23 | End: 2023-01-02

## 2023-04-17 RX ORDER — GEMFIBROZIL 600 MG/1
600 TABLET, FILM COATED ORAL 2 TIMES DAILY
Qty: 90 TABLET | Refills: 0 | Status: SHIPPED | OUTPATIENT
Start: 2023-04-17

## 2023-04-17 NOTE — TELEPHONE ENCOUNTER
Future appt: Your appointments     Date & Time Appointment Department [facility])    Apr 28, 2023  8:00 AM CDT Laboratory Visit with [facility] [address], Georgette Garcia  (EDW [employer] )            [address]  [address] [id number]  [phone]        Last Appointment with provider:   12/20/2022Apamm Sos-  Return advised in 6 months  Last appointment at [facility]:  12/20/2022    Gemfibrozil refilled 12/20/22 for 6 month supply     Cholesterol, Total   Date Value   11/16/2022 185 mg/dL   11/19/2018 190     Total Cholesterol (mg/dL)   Date Value   11/18/2020 213 (H)     HDL Cholesterol (mg/dL)   Date Value   11/16/2022 37 (L)   11/18/2020 32 (L)     LDL Cholesterol (mg/dL)   Date Value   11/16/2022 98   11/18/2020 108 (H)     Triglycerides (mg/dL)   Date Value   11/16/2022 299 (H)   11/18/2020 366 (H)   07/17/2017 225     Lab Results   Component Value Date     11/16/2022    A1C 5.5 11/16/2022     Lab Results   Component Value Date    TSH 3.560 11/16/2022       No follow-ups on file.

## 2023-04-28 ENCOUNTER — LABORATORY ENCOUNTER (OUTPATIENT)
Dept: LAB | Age: 58
End: 2023-04-28
Attending: FAMILY MEDICINE
Payer: COMMERCIAL

## 2023-04-28 DIAGNOSIS — E78.5 HYPERLIPIDEMIA: ICD-10-CM

## 2023-04-28 DIAGNOSIS — R74.8 ELEVATED LIVER ENZYMES: ICD-10-CM

## 2023-04-28 DIAGNOSIS — R73.9 HYPERGLYCEMIA: ICD-10-CM

## 2023-04-28 DIAGNOSIS — R79.89 ABNORMAL CBC: ICD-10-CM

## 2023-04-28 LAB
ALBUMIN SERPL-MCNC: 3.7 G/DL (ref 3.4–5)
ALBUMIN/GLOB SERPL: 1.2 {RATIO} (ref 1–2)
ALP LIVER SERPL-CCNC: 191 U/L
ALT SERPL-CCNC: 104 U/L
ANION GAP SERPL CALC-SCNC: 1 MMOL/L (ref 0–18)
AST SERPL-CCNC: 43 U/L (ref 15–37)
BASOPHILS # BLD AUTO: 0.06 X10(3) UL (ref 0–0.2)
BASOPHILS NFR BLD AUTO: 1.7 %
BILIRUB SERPL-MCNC: 0.6 MG/DL (ref 0.1–2)
BUN BLD-MCNC: 22 MG/DL (ref 7–18)
CALCIUM BLD-MCNC: 8.9 MG/DL (ref 8.5–10.1)
CHLORIDE SERPL-SCNC: 108 MMOL/L (ref 98–112)
CHOLEST SERPL-MCNC: 348 MG/DL (ref ?–200)
CO2 SERPL-SCNC: 28 MMOL/L (ref 21–32)
CREAT BLD-MCNC: 0.89 MG/DL
EOSINOPHIL # BLD AUTO: 0.34 X10(3) UL (ref 0–0.7)
EOSINOPHIL NFR BLD AUTO: 9.6 %
ERYTHROCYTE [DISTWIDTH] IN BLOOD BY AUTOMATED COUNT: 13.2 %
FASTING PATIENT LIPID ANSWER: YES
FASTING STATUS PATIENT QL REPORTED: YES
GFR SERPLBLD BASED ON 1.73 SQ M-ARVRAT: 75 ML/MIN/1.73M2 (ref 60–?)
GLOBULIN PLAS-MCNC: 3.2 G/DL (ref 2.8–4.4)
GLUCOSE BLD-MCNC: 121 MG/DL (ref 70–99)
HCT VFR BLD AUTO: 45.3 %
HDLC SERPL-MCNC: 34 MG/DL (ref 40–59)
HGB BLD-MCNC: 14.8 G/DL
IMM GRANULOCYTES # BLD AUTO: 0 X10(3) UL (ref 0–1)
IMM GRANULOCYTES NFR BLD: 0 %
LDLC SERPL DIRECT ASSAY-MCNC: 79 MG/DL (ref ?–100)
LYMPHOCYTES # BLD AUTO: 1.65 X10(3) UL (ref 1–4)
LYMPHOCYTES NFR BLD AUTO: 46.7 %
MCH RBC QN AUTO: 29.7 PG (ref 26–34)
MCHC RBC AUTO-ENTMCNC: 32.7 G/DL (ref 31–37)
MCV RBC AUTO: 91 FL
MONOCYTES # BLD AUTO: 0.35 X10(3) UL (ref 0.1–1)
MONOCYTES NFR BLD AUTO: 9.9 %
NEUTROPHILS # BLD AUTO: 1.13 X10 (3) UL (ref 1.5–7.7)
NEUTROPHILS # BLD AUTO: 1.13 X10(3) UL (ref 1.5–7.7)
NEUTROPHILS NFR BLD AUTO: 32.1 %
NONHDLC SERPL-MCNC: 314 MG/DL (ref ?–130)
OSMOLALITY SERPL CALC.SUM OF ELEC: 289 MOSM/KG (ref 275–295)
PLATELET # BLD AUTO: 211 10(3)UL (ref 150–450)
POTASSIUM SERPL-SCNC: 4.6 MMOL/L (ref 3.5–5.1)
PROT SERPL-MCNC: 6.9 G/DL (ref 6.4–8.2)
RBC # BLD AUTO: 4.98 X10(6)UL
SODIUM SERPL-SCNC: 137 MMOL/L (ref 136–145)
TRIGL SERPL-MCNC: 1227 MG/DL (ref 30–149)
WBC # BLD AUTO: 3.5 X10(3) UL (ref 4–11)

## 2023-04-28 PROCEDURE — 85025 COMPLETE CBC W/AUTO DIFF WBC: CPT | Performed by: FAMILY MEDICINE

## 2023-05-01 ENCOUNTER — TELEPHONE (OUTPATIENT)
Dept: FAMILY MEDICINE CLINIC | Facility: CLINIC | Age: 58
End: 2023-05-01

## 2023-05-01 NOTE — TELEPHONE ENCOUNTER
Noted-patient would like any further recommendations and/or discussion about Paxlovid prescription-recommend that she schedule an appointment via video visit.

## 2023-05-01 NOTE — TELEPHONE ENCOUNTER
Pt states her  tested positive for covid yesterday, pt states her home test is negative. Pt c/o of sore throat, body aches, and headache. No fever, no cough, no congestion reported. Offered appt. Pt states she will continue to monitor. Pt agreed to retest home covid test again in the next 1-2 days. Pt advised to take Zinc, Vitamin C, Vitamin D. Pt advised on symptomatic care, rest and good hydration. Pt urged to call back with concerns, questions.

## 2023-05-01 NOTE — TELEPHONE ENCOUNTER
is positive for covid. She has body aches and scratchy throat but her test is negative.  Wants to get some advice on what she should do

## 2023-05-02 ENCOUNTER — TELEMEDICINE (OUTPATIENT)
Dept: FAMILY MEDICINE CLINIC | Facility: CLINIC | Age: 58
End: 2023-05-02
Payer: COMMERCIAL

## 2023-05-02 ENCOUNTER — TELEPHONE (OUTPATIENT)
Dept: FAMILY MEDICINE CLINIC | Facility: CLINIC | Age: 58
End: 2023-05-02

## 2023-05-02 VITALS — BODY MASS INDEX: 24.55 KG/M2 | HEIGHT: 61 IN | WEIGHT: 130 LBS

## 2023-05-02 DIAGNOSIS — U07.1 COVID-19: Primary | ICD-10-CM

## 2023-05-02 LAB — AMB EXT COVID-19 RESULT: DETECTED

## 2023-05-02 PROCEDURE — 99213 OFFICE O/P EST LOW 20 MIN: CPT | Performed by: NURSE PRACTITIONER

## 2023-05-02 NOTE — TELEPHONE ENCOUNTER
Home covid test was positive today, pt exposed to her  who had also tested positive. Pt states her cough has increased, pt has chest congestion and more body aches.     Pt scheduled for video appt with Jodie Cook NP    Future Appointments   Date Time Provider Vicente Estrada   5/2/2023 11:30 AM ESPERANZA Andujar [facility] [place]

## 2023-05-03 ENCOUNTER — TELEPHONE (OUTPATIENT)
Dept: FAMILY MEDICINE CLINIC | Facility: CLINIC | Age: 58
End: 2023-05-03

## 2023-05-03 NOTE — TELEPHONE ENCOUNTER
Patient is welcome to followup with me BUT I strongly advise she have a cardiologist as well. Patient should check options with insurance coverage.

## 2023-05-03 NOTE — TELEPHONE ENCOUNTER
Pt had labs drawn on 4/28 for Dr. Pa Navarro. Pt states she had to cancel her appt on 5/5 due to current covid diagnosis. Pt states her triglycerides were >1,000. Pt states she mixed up her Gemfibrozil pills so pt hasn't taken her medication in 3 wks. Pt states she tried calling cardiology office, but pt is very frustrated with that office and is actively looking for new cardiologist.    Pt is hoping she can have CR order labs to check Lipid panel again in 3 months, pt states she can follow up with CR and can discuss plan then to see another cardiogist if needed at that time.

## 2023-05-08 ENCOUNTER — TELEPHONE (OUTPATIENT)
Dept: FAMILY MEDICINE CLINIC | Facility: CLINIC | Age: 58
End: 2023-05-08

## 2023-05-08 DIAGNOSIS — E78.1 FAMILIAL HYPERTRIGLYCERIDEMIA: Primary | ICD-10-CM

## 2023-05-08 DIAGNOSIS — E78.1 PURE HYPERTRIGLYCERIDEMIA: ICD-10-CM

## 2023-05-08 NOTE — TELEPHONE ENCOUNTER
See phone note dated 5/3/23. Pt was advised then to find another cardiologist to see and to call back for referral.    Referral order cued up for Dr. Nadege Vázquez, will fax with lab flow sheet.

## 2023-05-08 NOTE — TELEPHONE ENCOUNTER
Pt states that she contacted Dr. Bennie Vee office and schedule an appt in July. States that they wants lab results for patients triglycerides from the past 2 years. Pt didn't had a fax number.

## 2023-06-27 RX ORDER — GEMFIBROZIL 600 MG/1
600 TABLET, FILM COATED ORAL 2 TIMES DAILY
Qty: 180 TABLET | Refills: 0 | Status: SHIPPED | OUTPATIENT
Start: 2023-06-27

## 2023-07-11 RX ORDER — GABAPENTIN 300 MG/1
600 CAPSULE ORAL 3 TIMES DAILY
Qty: 540 CAPSULE | Refills: 0 | Status: SHIPPED | OUTPATIENT
Start: 2023-07-11

## 2023-07-11 NOTE — TELEPHONE ENCOUNTER
Last Refill: 11/07/2022 300mg #540 with 1 Refill   Last Px: 12/20/2022        Future appt: Your appointments       Date & Time Appointment Department [facility])    Aug 16, 2023 11:30 AM CDT Adult Physical with Gil Neville MD [address] [facility] [place] ([facility])    PLEASE NOTE - Most insurances allow a Complete Physical once every 366 days. Please schedule accordingly. Please arrive 15 minutes prior to your scheduled appointment. Please also bring your Insurance card, Photo ID, and your medication bottles or a list of your current medication. If you no longer require this appointment, please contact your physician office to cancel. [address] [facility] [address] [id number] [phone]          Last Appointment with provider:   Visit date not found  Last appointment at [facility] [place]:  Visit date not found  Cholesterol, Total   Date Value   04/28/2023 348 mg/dL (H)   11/19/2018 190     Total Cholesterol (mg/dL)   Date Value   11/18/2020 213 (H)     HDL Cholesterol (mg/dL)   Date Value   04/28/2023 34 (L)   11/18/2020 32 (L)     LDL Cholesterol   Date Value   04/28/2023      Comment:     Unable to calculate LDL due to Triglycerides >800 mg/dL        Desirable <100 mg/dL   Borderline 100-129 mg/dL   High     >=130mg/dL       11/18/2020 108 mg/dL (H)     Triglycerides (mg/dL)   Date Value   04/28/2023 1,227 (H)   11/18/2020 366 (H)   07/17/2017 225     Lab Results   Component Value Date     11/16/2022    A1C 5.5 11/16/2022     Lab Results   Component Value Date    TSH 3.560 11/16/2022       No follow-ups on file.

## 2023-09-06 NOTE — TELEPHONE ENCOUNTER
From: Alexander Escobedo  To: Ceci Das MD  Sent: 12/23/2022 7:23 AM CST  Subject: Feeling worse     Coughing up brown phlegm, can I get an Antibiotic?  No fever
Pt contacted-  Pt was seen on 12/20/22. Pt states she had discussed having fluid in her ears and clicking at her appt with CR. Pt states she is now coughing, pt reports both nasal and chest congestion. Virtual appt scheduled with CS this morning.     Future Appointments   Date Time Provider Vicente Estrada   12/23/2022  8:30 AM Hari Menendez APRN [facility] [place]   3/28/2023  8:00 AM [facility] [place]
VANDANA woodson

## 2023-09-25 RX ORDER — GEMFIBROZIL 600 MG/1
600 TABLET, FILM COATED ORAL 2 TIMES DAILY
Qty: 180 TABLET | Refills: 0 | Status: SHIPPED | OUTPATIENT
Start: 2023-09-25

## 2023-09-25 NOTE — TELEPHONE ENCOUNTER
Last Refill:06/27/2023 #180 with 0 Refills  Last Px: 12/20/2022       Future appt: Your appointments       Date & Time Appointment Department [facility])    Dec 01, 2023 11:30 AM CST Presurgical Visit with Adrián Douglas MD [address], [place] ([facility])        Dec 22, 2023  8:00 AM CST Adult Physical with dArián Douglas MD [address] [place] ([facility])    PLEASE NOTE - Most insurances allow a Complete Physical once every 366 days. Please schedule accordingly. Please arrive 15 minutes prior to your scheduled appointment. Please also bring your Insurance card, Photo ID, and your medication bottles or a list of your current medication. If you no longer require this appointment, please contact your physician office to cancel. [address] [facility]  [facility] [address] [id number]  [phone]          Last Appointment with provider:   Visit date not found  Last appointment at [facility]:  Visit date not found  Cholesterol, Total   Date Value   04/28/2023 348 mg/dL (H)   11/19/2018 190     Total Cholesterol (mg/dL)   Date Value   11/18/2020 213 (H)     HDL Cholesterol (mg/dL)   Date Value   04/28/2023 34 (L)   11/18/2020 32 (L)     LDL Cholesterol   Date Value   04/28/2023      Comment:     Unable to calculate LDL due to Triglycerides >800 mg/dL        Desirable <100 mg/dL   Borderline 100-129 mg/dL   High     >=130mg/dL       11/18/2020 108 mg/dL (H)     Triglycerides (mg/dL)   Date Value   04/28/2023 1,227 (H)   11/18/2020 366 (H)   07/17/2017 225     Lab Results   Component Value Date     11/16/2022    A1C 5.5 11/16/2022     Lab Results   Component Value Date    TSH 3.560 11/16/2022       No follow-ups on file.

## 2024-06-05 NOTE — PATIENT INSTRUCTIONS
May use ibuprofen 600 mg tid, prn for pain  Elevate wrist and use ice / heat for relief  Keeps wounds clean, monitor for signs of infection
slightly unsteady with no ep of LOB/impaired balance/decreased strength

## 2024-11-20 NOTE — TELEPHONE ENCOUNTER
WHAT IS COLONOSCOPY?  Colonoscopy is an effective procedure to diagnose abnormalities of the large intestine and to screen for colorectal cancer and colorectal polyps. A colonoscope is a long, thin flexible instrument that provides magnified views of the colon and rectum. The procedure is frequently performed in an outpatient setting with minimal discomfort and inconvenience. Because colonoscopy allows doctors to identify and remove certain types of colon polyps that may develop into cancer, colonoscopy can be a therapeutic and even life-saving procedure.   WHO SHOULD HAVE A COLONOSCOPY?  Screening refers to the process of examining otherwise healthy patients in an effort to detect previously undiagnosed colon polyps or cancer. The goal of a screening program is to detect disease at its earliest stages to allow for successful treatment. As part of a colorectal cancer screening program, colonoscopy is routinely recommended to adults starting at age 50. Patients who have a family history of colon or rectal cancer or polyps may be recommended for a colonoscopy earlier and more frequently than those without a family history of cancer. Your doctor may also recommend a colonoscopy to evaluate symptoms such as rectal bleeding, a change in bowel habits, or unexplained abdominal pains.  COLONOSCOPY MAY ALSO BE RECOMMENDED FOR:  Follow-up examinations for patients who have a personal history of colon or rectal polyps or cancer  Patients with acute or chronic anemia  Patients with inflammatory bowel disease (e.g., Crohn’s disease or ulcerative colitis)  Patients with certain familial hereditary conditions such as hereditary nonpolyposis colorectal cancer (also known as Alvarado syndrome)  WHO CAN PERFORM A COLONOSCOPY?  A colonoscopy is performed by experienced physicians who are specially trained in this type of procedure. Typically, colonoscopy is performed by gastroenterologists, colorectal surgeons, or general surgeons.  HOW  ----- Message from Gifty Kessler MD sent at 12/1/2020  3:27 PM CST -----  Normal pap, negative HPV screen, repeat 5 years  Results forwarded to patient via [employer] system. Patient encouraged to call for any questions or concerns. IS COLONOSCOPY PERFORMED?  One or two days prior to the procedure, most patients must complete a bowel “prep”- a prescribed preparation consisting of liquids that will cleanse the bowels of stool and other residue. This allows for complete visualization of the bowel surface during the procedure. Your doctor will most likely give you a list of dietary and medication restrictions to adhere to in the days leading up to the procedure. The most important part of the procedure is your completion of the cleansing process as requested by your physician. If you have any questions at all, do not hesitate to discuss your concerns with your physician before the day of the procedure.  During the colonoscopy, most patients receive intravenous sedation. One or more medications are administered to help patients remain comfortable for the duration of the procedure. The colonoscope is inserted via the rectum and advanced to the first portion of the colon, where it is connected to the end of the small intestine. Any polyps or other abnormalities encountered during the colonoscopy will be removed and/or biopsied and sent for analysis.  For most patients, the entire procedure takes less than an hour. After the colonoscopy is completed some patients may experience slight discomfort in the form of abdominal cramping and “gas pains,” though this quickly resolves by passing any gas/air that was insufflated during the procedure. In many cases, patients do not recall specifics of the procedure itself due to the sedation. It is always important to have the individual who will be taking you home be there to discuss the discharge instructions with the physician and nurse before discharge.  Following a colonoscopy, patients usually resume their regular diet. Resumption of your pre-procedure medications will be determined by your physician. Some restrictions for driving and activity levels apply when intravenous sedation medications are given to  sedate patients immediately prior to colonoscopy. These medications affect judgment and coordination for variable amounts of time following the procedure. Most patients are able to resume normal activity the morning following the colonoscopy.  WHAT ARE THE BENEFITS OF COLONOSCOPY?  Colonoscopy is the recommended means of colorectal cancer screening. The procedure allows for detection and removal of colon polyps that are prone to transform into cancer.  WHAT ARE THE RISKS OF COLONOSCOPY?  Colonoscopy is a very safe procedure with few complications, occurring in less than 1% of patients. Infrequent risks include bleeding, perforation (a tear in the intestine), rare side effects from sedation medicines, and inability to visualize the entire colon for polyps or other conditions. For anatomical reasons your physician may deem it unsafe to complete the colonoscopy and your physician will therefore terminate the examination. In such instances, your physician will discuss with you whether or not additional or alternative examinations are indicated.  DISCLAIMER  The American Society of Colon and Rectal Surgeons is dedicated to ensuring high-quality patient care by advancing the science, prevention and management of disorders and diseases of the colon, rectum and anus. These brochures are inclusive but not prescriptive. Their purpose is to provide information on diseases and processes, rather than dictate a specific form of treatment. They are intended for the use of all practitioners, health care workers and patients who desire information about the management of the conditions addressed. It should be recognized that these brochures should not be deemed inclusive of all proper methods of care or exclusive of methods of care reasonably directed to obtain the same results. The ultimate judgment regarding the propriety of any specific procedure must be made by the physician in light of all the circumstances presented by the  individual patient.     [facility]  What You Need to Know for Your Procedure with Dr. BRIGHT    We’re happy you and your physician have chosen [facility] for your care. Your procedure has been scheduled through your physician’s office. There are a few things you will need to know prior to arriving for your procedure.    Procedure date and time:     Please arrive at:     ++Please note that your procedure time may change due to adjustments in the schedule. This will also change your arrival time. We will contact you with any changes.     If you need to cancel or reschedule your procedure, please call our office within 3 business days of your scheduled procedure day or you may be subject to a cancellation fee. Failure to contact our office to cancel your procedure will result in a no-show fee.     The last day to cancel or reschedule your procedure without penalty is:     Your Registration   Please arrive for registration prior to your appointment to [facility] Guevara [place], [place]   Someone will direct you to [facility] on the 1st floor of the [facility].    You’ll be asked to complete a few registration forms, as well as provide photo identification, such as a ’s license or state ID, and insurance information.    Make Sure You Have an Escort:To ensure your safety, you must have a responsible adult to accompany you home following your procedure. A member of our staff will call to verify your escort home, should they not arrive with you. We take your safety seriously and, if you do not have an appropriate escort, we will make arrangements for transportation or reschedule your appointment. Please be aware that you will not be allowed to drive yourself home, take a cab or take public transportation unescorted following your procedure.  Your friend or relative is welcome to wait during your procedure in your patient room  or in our comfortable waiting area. Visitors are also encouraged to check out our [employer] café for a meal, light snack or beverage while they wait.    What to Expect   You will be asked to change into a hospital gown and your personal belongings will be kept with you. However, please leave your valuables, such as jewelry or personal electronics, at home.   Prior to your procedure, you will receive an IV for procedural sedation to ensure your comfort.   Expect your procedure to take approximately 30 to 45 minutes. During the procedure, your physician may take tissue samples, or biopsies, or remove polyps, growths in your colon’s lining.   Typically, you will be asked to lie on your left side for the procedure; however, that depends on your case.  Recovery Period: Following your procedure, you will be taken to the recovery area, where you will stay for approximately another 30 to 45 minutes. Your visitors will not be able to see you yet. Your colon will have been expanded with air during your procedure, so you will be encouraged to pass gas while in recovery.    Going Home: You will receive instructions and important contact information before you are sent home. You will not be able to drive, operate machinery or return to work until the next day. We’ll ask that you go home, relax and continue to recover. Also, please be aware that you should not drink alcoholic beverages for 24 hours following your procedure.    INSURANCE COVERAGE REGARDING PAYMENT  FOR YOUR COLONOSCOPY        Colon Cancer is the second leading cause of death among cancers, per the American Cancer Society. It is preventable. Early detection is the key. Your doctor will determine which tests need to be done for prevention and/or treatment. However, colonoscopies can be performed for several reasons:     Screening To screen for any problems within the colon. In this case, the patient is not symptomatic and does not have a personal history of previous  colon cancer/condition or abnormal findings. Billed as screening.   Surveillance To monitor for a previously diagnosed colon condition (such as polyps) or when performed at more frequent intervals than every ten years because the patient has a personal/family history of colonic polyps or colon cancer. Billed as diagnostic.   Diagnostic Patient with symptoms, used to evaluate the colon. Billed as diagnostic.       If during a screening colonoscopy, our physician finds an abnormality, performs a biopsy or polypectomy (removal of polyp), your insurance company may consider the procedure to be a diagnostic exam and no longer a screening procedure.     Every insurance company is different. We encourage you to call your insurance company regarding plan benefits. Generally, screening benefits and diagnostic benefits may be paid at different levels. Charges associated with anesthesia or type of facility may also be processed differently. This varies with each insurance company, so we want you to be aware of this prior to your procedure. You do not have to call your insurance company if you have Medicare. For an estimate of potential charges, you may call the Patient Contact Center at [phone], option 5.     [facility] will contact your insurance company to check precertification requirements for your colonoscopy. However, precertification, which serves as notification is never a guarantee of payment. If you have questions regarding precertification for your procedure, please contact your insurance company.    BOWEL PREPARATION FOR COLONOSCOPY/SURGERY  INSTRUCTIONS    If you are on blood thinning medication, please contact your doctor regarding specific instructions on when to hold the medication prior to your procedure.   It is ok to take aspirin or clopidogrel prior to your procedure.    The day before your scheduled colonoscopy/surgery:  You must stop all solid foods and begin a  clear liquid diet as soon as you wake up. A clear liquid diet is any liquid you can see through. This includes apple juice, lemonade, 7-up or Sprite, vitamin water, water flavor boosters, Gatorade, popsicles, Jell-O, chicken broth, beef broth, vegetable broth, black coffee and tea.  You cannot have dairy, orange juice or any juice with pulp or sediment. Do not drink anything red or purple until after the procedure. Do not drink any alcoholic beverages.   Continue the clear liquid diet for the rest of the day.   At 6PM, take the first dose of your Golytely Bowel Preparation.  Drink the preparation one glass at a time until you finish ½ of the gallon. You may find that having the medication chilled makes it easier to drink. You can mix the medication with 7-up to improve the taste.   At 10PM, take the second dose of your Golytely Bowel Preparation. Drink the preparation one glass at a time until you finish the second ½ of the gallon.   Continue drinking clear liquids during your prep until midnight. Nothing to eat or drink after midnight.      The day of your colonoscopy:  Do not eat or drink anything.  If you are taking medication for your blood pressure or heart, take your morning medications with a sip of water. If you have diabetes, do not take your diabetic medication the morning of your procedure.

## (undated) NOTE — MR AVS SNAPSHOT
[address]  [place] [address] [id number] [phone]               Thank you for choosing us for your health care visit with Salty Kelley MD.  We are glad to serve you and happy to provide you with this summary Take 2 g by mouth 2 (two) times daily. NASONEX 50 MCG/ACT Susp   Generic drug:  Mometasone Furoate           niacin 500 MG Tabs   Take 500 mg by mouth daily. Zolpidem Tartrate 5 MG Tabs   Take 5 mg by mouth nightly as needed.    Commonly Modification Recommendation   Weight Reduction Maintain normal body weight (body mass index 18.5-24.9 kg/m2)   DASH eating plan Adopt a diet rich in fruits, vegetables, and low fat dairy products with reduced content of saturated and total fat.    Dietary s Don’t forget strength training with weights and resistance Set goals and track your progress   You don’t need to join a gym. Home exercises work great.  Put more priority on exercise in your life                    Visit [facility] online at

## (undated) NOTE — LETTER
09/12/19        [address]  [place] [id number]      Dear Theradha Morley records indicate that you have outstanding lab work and or testing that was ordered for you and has not yet been completed:  Orders Placed This Encounter

## (undated) NOTE — LETTER
10/27/17        [address] [facility] [id number]      Dear Glenna Law records indicate that you have outstanding lab work and or testing that was ordered for you and has not yet been completed:          Comp Metabolic Panel

## (undated) NOTE — LETTER
09/22/18        [address]  [place] [id number]      Dear Janie Stallings records indicate that you have outstanding lab work and or testing that was ordered for you and has not yet been completed:  Orders Placed This Encounter

## (undated) NOTE — LETTER
10/21/20        [facility] [facility] [address]      Dear Heidy Sanchez records indicate that you have outstanding lab work and or testing that was ordered for you and has not yet been completed:  Orders Placed This Encounter

## (undated) NOTE — MR AVS SNAPSHOT
[address]  [place] [address] [id number]  [phone]               Thank you for choosing us for your health care visit with Rani Peña MD.  We are glad to serve you and happy to provide you with this summary anastrozole 1 MG Tabs tab   Take 1 mg by mouth daily. Commonly known as:  ARIMIDEX           BENTYL 20 MG Tabs   Generic drug:  Dicyclomine HCl   Take 20 mg by mouth.  PRN           CALCIUM 1000 + D 1000-800 MG-UNIT Tabs   Generic drug:  Calcium Carb-Cho